# Patient Record
Sex: MALE | Race: WHITE | NOT HISPANIC OR LATINO
[De-identification: names, ages, dates, MRNs, and addresses within clinical notes are randomized per-mention and may not be internally consistent; named-entity substitution may affect disease eponyms.]

---

## 2019-06-13 ENCOUNTER — APPOINTMENT (OUTPATIENT)
Dept: INTERNAL MEDICINE | Facility: CLINIC | Age: 64
End: 2019-06-13
Payer: MEDICAID

## 2019-06-13 VITALS
SYSTOLIC BLOOD PRESSURE: 120 MMHG | HEART RATE: 93 BPM | DIASTOLIC BLOOD PRESSURE: 70 MMHG | OXYGEN SATURATION: 97 % | BODY MASS INDEX: 22.48 KG/M2 | HEIGHT: 70 IN | WEIGHT: 157 LBS | TEMPERATURE: 99.1 F

## 2019-06-13 DIAGNOSIS — M72.0 PALMAR FASCIAL FIBROMATOSIS [DUPUYTREN]: ICD-10-CM

## 2019-06-13 DIAGNOSIS — Z11.4 ENCOUNTER FOR SCREENING FOR HUMAN IMMUNODEFICIENCY VIRUS [HIV]: ICD-10-CM

## 2019-06-13 DIAGNOSIS — Z00.00 ENCOUNTER FOR GENERAL ADULT MEDICAL EXAMINATION W/OUT ABNORMAL FINDINGS: ICD-10-CM

## 2019-06-13 DIAGNOSIS — M62.40 CONTRACTURE OF MUSCLE, UNSPECIFIED SITE: ICD-10-CM

## 2019-06-13 PROCEDURE — 99386 PREV VISIT NEW AGE 40-64: CPT | Mod: 25

## 2019-06-13 PROCEDURE — 94010 BREATHING CAPACITY TEST: CPT

## 2019-06-13 NOTE — PHYSICAL EXAM
[No Acute Distress] : no acute distress [Well Nourished] : well nourished [Well Developed] : well developed [Well-Appearing] : well-appearing [Normal Sclera/Conjunctiva] : normal sclera/conjunctiva [PERRL] : pupils equal round and reactive to light [Normal Outer Ear/Nose] : the outer ears and nose were normal in appearance [Normal Oropharynx] : the oropharynx was normal [Supple] : supple [No Lymphadenopathy] : no lymphadenopathy [No Respiratory Distress] : no respiratory distress  [Clear to Auscultation] : lungs were clear to auscultation bilaterally [No Accessory Muscle Use] : no accessory muscle use [Normal Rate] : normal rate  [Regular Rhythm] : with a regular rhythm [Normal S1, S2] : normal S1 and S2 [No Carotid Bruits] : no carotid bruits [Pedal Pulses Present] : the pedal pulses are present [No Edema] : there was no peripheral edema [Soft] : abdomen soft [Non Tender] : non-tender [Non-distended] : non-distended [No Masses] : no abdominal mass palpated [Normal Bowel Sounds] : normal bowel sounds [Normal Supraclavicular Nodes] : no supraclavicular lymphadenopathy [Normal Axillary Nodes] : no axillary lymphadenopathy [Normal Posterior Cervical Nodes] : no posterior cervical lymphadenopathy [Normal Anterior Cervical Nodes] : no anterior cervical lymphadenopathy [Grossly Normal Strength/Tone] : grossly normal strength/tone [Coordination Grossly Intact] : coordination grossly intact [No Focal Deficits] : no focal deficits [Normal Affect] : the affect was normal [Normal Insight/Judgement] : insight and judgment were intact [Declined Rectal Exam] : declined rectal exam [de-identified] : heart murmur [de-identified] : thyroid enlarged [de-identified] : inguinal LN [de-identified] : contracted tendon R tendon

## 2019-06-13 NOTE — PLAN
[FreeTextEntry1] : discussed CT scan for lung CA screen-pt refusing \par pt refused EKG- states he will send a recent EKG to me.\par rec shingrix vaccine \par pt will release tdap vaccine record\par spirometry-nl

## 2019-06-13 NOTE — COUNSELING
[Healthy eating counseling provided] : healthy eating [Activity counseling provided] : activity [Good understanding] : Patient has a good understanding of disease, goals and obesity follow-up plan [Discussed Risks and Advised to Quit Smoking] : Discussed risks and advised to quit smoking [Discussed Cessation Medication] : cessation medication was discussed [Discussed Cessation Strategies] : cessation strategies were discussed [Quit Smoking] : Quit Smoking

## 2019-06-13 NOTE — HISTORY OF PRESENT ILLNESS
[FreeTextEntry1] : CPE [de-identified] : vaccine- pt states he had tetanus vaccine.  pt refused pneum vaccine`\par diet- needs improvement\par exercise-no\par pt states he had CLL for 14 yr.  \par pt states he had labs at [facility] but I don't see any records on the computer\par pt c/o fatigue, weakness- last seen hematology 5 yr ago. pt denies any wgt loss over past yr.\par [facility]

## 2019-06-13 NOTE — HEALTH RISK ASSESSMENT
[0] : 2) Feeling down, depressed, or hopeless: Not at all (0) [Hepatitis C test offered] : Hepatitis C test offered [HIV test declined] : HIV test declined [ColonoscopyComments] : never [de-identified] : pt has dentures

## 2019-06-14 ENCOUNTER — EMERGENCY (EMERGENCY)
Facility: HOSPITAL | Age: 64
LOS: 1 days | Discharge: HOME | End: 2019-06-14
Attending: EMERGENCY MEDICINE | Admitting: EMERGENCY MEDICINE
Payer: MEDICAID

## 2019-06-14 ENCOUNTER — APPOINTMENT (OUTPATIENT)
Dept: INTERNAL MEDICINE | Facility: CLINIC | Age: 64
End: 2019-06-14
Payer: MEDICAID

## 2019-06-14 ENCOUNTER — CHART COPY (OUTPATIENT)
Age: 64
End: 2019-06-14

## 2019-06-14 ENCOUNTER — LABORATORY RESULT (OUTPATIENT)
Age: 64
End: 2019-06-14

## 2019-06-14 VITALS
HEART RATE: 80 BPM | TEMPERATURE: 98 F | SYSTOLIC BLOOD PRESSURE: 129 MMHG | RESPIRATION RATE: 15 BRPM | OXYGEN SATURATION: 99 % | DIASTOLIC BLOOD PRESSURE: 74 MMHG

## 2019-06-14 LAB
APTT BLD: 32.6 SEC — SIGNIFICANT CHANGE UP (ref 27.5–36.3)
BASOPHILS # BLD AUTO: 0.02 K/UL — SIGNIFICANT CHANGE UP (ref 0–0.2)
BASOPHILS NFR BLD AUTO: 0.1 % — SIGNIFICANT CHANGE UP (ref 0–2)
EOSINOPHIL # BLD AUTO: 0.15 K/UL — SIGNIFICANT CHANGE UP (ref 0–0.5)
EOSINOPHIL NFR BLD AUTO: 1.1 % — SIGNIFICANT CHANGE UP (ref 0–6)
HCT VFR BLD CALC: 24.6 % — LOW (ref 39–50)
HGB BLD-MCNC: 6.8 G/DL — CRITICAL LOW (ref 13–17)
IMM GRANULOCYTES NFR BLD AUTO: 0.1 % — SIGNIFICANT CHANGE UP (ref 0–1.5)
INR BLD: 1.18 — HIGH (ref 0.88–1.17)
LYMPHOCYTES # BLD AUTO: 11.49 K/UL — HIGH (ref 1–3.3)
LYMPHOCYTES # BLD AUTO: 83.4 % — HIGH (ref 13–44)
MCHC RBC-ENTMCNC: 25.4 PG — LOW (ref 27–34)
MCHC RBC-ENTMCNC: 27.6 % — LOW (ref 32–36)
MCV RBC AUTO: 91.8 FL — SIGNIFICANT CHANGE UP (ref 80–100)
MONOCYTES # BLD AUTO: 1.19 K/UL — HIGH (ref 0–0.9)
MONOCYTES NFR BLD AUTO: 8.6 % — SIGNIFICANT CHANGE UP (ref 2–14)
NEUTROPHILS # BLD AUTO: 0.91 K/UL — LOW (ref 1.8–7.4)
NEUTROPHILS NFR BLD AUTO: 6.7 % — LOW (ref 43–77)
NRBC # FLD: 0 K/UL — SIGNIFICANT CHANGE UP (ref 0–0)
PROTHROM AB SERPL-ACNC: 13.5 SEC — HIGH (ref 9.8–13.1)
RBC # BLD: 2.68 M/UL — LOW (ref 4.2–5.8)
RBC # FLD: 18.3 % — HIGH (ref 10.3–14.5)
WBC # BLD: 13.77 K/UL — HIGH (ref 3.8–10.5)
WBC # FLD AUTO: 13.77 K/UL — HIGH (ref 3.8–10.5)

## 2019-06-14 PROCEDURE — 99284 EMERGENCY DEPT VISIT MOD MDM: CPT

## 2019-06-14 PROCEDURE — 85060 BLOOD SMEAR INTERPRETATION: CPT

## 2019-06-14 PROCEDURE — 36415 COLL VENOUS BLD VENIPUNCTURE: CPT

## 2019-06-14 PROCEDURE — 99234 HOSP IP/OBS SM DT SF/LOW 45: CPT

## 2019-06-14 NOTE — ED PROVIDER NOTE - PHYSICAL EXAMINATION
PHYSICAL EXAM:  GENERAL: NAD, well-groomed, well-developed  HEAD:  Atraumatic, Normocephalic  EYES: EOMI, PERRLA, conjunctival pallor   ENMT: No tonsillar erythema, exudates, or enlargement; Moist mucous membranes  NECK: Supple, No JVD  HEART: Regular rate and rhythm; No murmurs, rubs, or gallops  RESPIRATORY: CTA B/L, No W/R/R  ABDOMEN: Soft, Nontender, Nondistended   NEURO: A&Ox3, nonfocal, moving all extremities  EXTREMITIES:  2+ Peripheral Pulses, No clubbing, cyanosis, or edema  SKIN: warm, dry, pale

## 2019-06-14 NOTE — ED PROVIDER NOTE - PROGRESS NOTE DETAILS
Havetry PGY1- hgb 6.8, down from 7.3 3 weeks ago, 1 unit PRBC ordered, pt refusing rectal/guiaic exam, will place in CDU

## 2019-06-14 NOTE — ED ADULT TRIAGE NOTE - CHIEF COMPLAINT QUOTE
alert was told to have transfusion doesn't know HGB  hx CLL  appears pale    no c/o pain or discomfort

## 2019-06-14 NOTE — ED PROVIDER NOTE - OBJECTIVE STATEMENT
63 yoM, PMHx of former opiate abuse, now on methadone, CLL for approx 17 years no tx sent to ED for anemia on outpatient labs today. Pt states it was seen on labs 1-2 months ago as well. Feels fatigued and weak. No CP, SOB, syncope, or palpitations. No blood in stool. No AC use. No transfusions in past.

## 2019-06-14 NOTE — ED ADULT NURSE NOTE - OBJECTIVE STATEMENT
break coverage RN- pt with PMH CLL, IV drug abuse, last use 5 years ago, currently in methadone clinic,  sent in from [facility] for low HBG, unsure of level. pt currently awake, a/ox3, vitally stable, ambulatory in NAD at this time, pt appears pale, denies any SOB, CP, visual changes, IV 20g placed to right forearm, blood work sent. MD at bedside, awaiting further orders from MD, will continue to monitor, pt safety maintained.

## 2019-06-14 NOTE — ED PROVIDER NOTE - ATTENDING CONTRIBUTION TO CARE
I performed a face-to-face evaluation of the patient and performed a history and physical examination. I agree with the history and physical examination.    Gracy: CLL. Called by PA for low Hb. No e/o bleeding. Labs, transfuse PRBCs.

## 2019-06-15 VITALS
RESPIRATION RATE: 18 BRPM | SYSTOLIC BLOOD PRESSURE: 115 MMHG | HEART RATE: 58 BPM | TEMPERATURE: 98 F | DIASTOLIC BLOOD PRESSURE: 56 MMHG | OXYGEN SATURATION: 100 %

## 2019-06-15 LAB
ALBUMIN SERPL ELPH-MCNC: 3.9 G/DL — SIGNIFICANT CHANGE UP (ref 3.3–5)
ALP SERPL-CCNC: 110 U/L — SIGNIFICANT CHANGE UP (ref 40–120)
ALT FLD-CCNC: 4 U/L — SIGNIFICANT CHANGE UP (ref 4–41)
ANION GAP SERPL CALC-SCNC: 12 MMO/L — SIGNIFICANT CHANGE UP (ref 7–14)
ANISOCYTOSIS BLD QL: SLIGHT — SIGNIFICANT CHANGE UP
AST SERPL-CCNC: 10 U/L — SIGNIFICANT CHANGE UP (ref 4–40)
BASOPHILS NFR SPEC: 1.7 % — SIGNIFICANT CHANGE UP (ref 0–2)
BILIRUB SERPL-MCNC: 0.3 MG/DL — SIGNIFICANT CHANGE UP (ref 0.2–1.2)
BLASTS # FLD: 0 % — SIGNIFICANT CHANGE UP (ref 0–0)
BLD GP AB SCN SERPL QL: NEGATIVE — SIGNIFICANT CHANGE UP
BUN SERPL-MCNC: 16 MG/DL — SIGNIFICANT CHANGE UP (ref 7–23)
CALCIUM SERPL-MCNC: 8.7 MG/DL — SIGNIFICANT CHANGE UP (ref 8.4–10.5)
CHLORIDE SERPL-SCNC: 104 MMOL/L — SIGNIFICANT CHANGE UP (ref 98–107)
CO2 SERPL-SCNC: 25 MMOL/L — SIGNIFICANT CHANGE UP (ref 22–31)
CREAT SERPL-MCNC: 0.91 MG/DL — SIGNIFICANT CHANGE UP (ref 0.5–1.3)
DACRYOCYTES BLD QL SMEAR: SLIGHT — SIGNIFICANT CHANGE UP
ELLIPTOCYTES BLD QL SMEAR: SLIGHT — SIGNIFICANT CHANGE UP
EOSINOPHIL NFR FLD: 2.6 % — SIGNIFICANT CHANGE UP (ref 0–6)
GIANT PLATELETS BLD QL SMEAR: PRESENT — SIGNIFICANT CHANGE UP
GLUCOSE SERPL-MCNC: 112 MG/DL — HIGH (ref 70–99)
HAPTOGLOB SERPL-MCNC: 326 MG/DL — HIGH (ref 34–200)
HCT VFR BLD CALC: 27.9 % — LOW (ref 39–50)
HGB BLD-MCNC: 8.1 G/DL — LOW (ref 13–17)
HYPOCHROMIA BLD QL: SIGNIFICANT CHANGE UP
LDH SERPL L TO P-CCNC: 214 U/L — SIGNIFICANT CHANGE UP (ref 135–225)
LYMPHOCYTES NFR SPEC AUTO: 68.4 % — HIGH (ref 13–44)
MACROCYTES BLD QL: SLIGHT — SIGNIFICANT CHANGE UP
MCHC RBC-ENTMCNC: 26 PG — LOW (ref 27–34)
MCHC RBC-ENTMCNC: 29 % — LOW (ref 32–36)
MCV RBC AUTO: 89.7 FL — SIGNIFICANT CHANGE UP (ref 80–100)
METAMYELOCYTES # FLD: 0 % — SIGNIFICANT CHANGE UP (ref 0–1)
MICROCYTES BLD QL: SLIGHT — SIGNIFICANT CHANGE UP
MONOCYTES NFR BLD: 2.6 % — SIGNIFICANT CHANGE UP (ref 2–9)
MYELOCYTES NFR BLD: 0 % — SIGNIFICANT CHANGE UP (ref 0–0)
NEUTROPHIL AB SER-ACNC: 14.5 % — LOW (ref 43–77)
NEUTS BAND # BLD: 0 % — SIGNIFICANT CHANGE UP (ref 0–6)
NRBC # FLD: 0 K/UL — SIGNIFICANT CHANGE UP (ref 0–0)
OTHER - HEMATOLOGY %: 0.8 — SIGNIFICANT CHANGE UP
OVALOCYTES BLD QL SMEAR: SLIGHT — SIGNIFICANT CHANGE UP
PLATELET # BLD AUTO: 75 K/UL — LOW (ref 150–400)
PLATELET # BLD AUTO: 82 K/UL — LOW (ref 150–400)
PLATELET COUNT - ESTIMATE: SIGNIFICANT CHANGE UP
PMV BLD: 10.3 FL — SIGNIFICANT CHANGE UP (ref 7–13)
PMV BLD: 10.9 FL — SIGNIFICANT CHANGE UP (ref 7–13)
POIKILOCYTOSIS BLD QL AUTO: SLIGHT — SIGNIFICANT CHANGE UP
POLYCHROMASIA BLD QL SMEAR: SLIGHT — SIGNIFICANT CHANGE UP
POTASSIUM SERPL-MCNC: 4.1 MMOL/L — SIGNIFICANT CHANGE UP (ref 3.5–5.3)
POTASSIUM SERPL-SCNC: 4.1 MMOL/L — SIGNIFICANT CHANGE UP (ref 3.5–5.3)
PROMYELOCYTES # FLD: 0 % — SIGNIFICANT CHANGE UP (ref 0–0)
PROT SERPL-MCNC: 6.9 G/DL — SIGNIFICANT CHANGE UP (ref 6–8.3)
RBC # BLD: 3.11 M/UL — LOW (ref 4.2–5.8)
RBC # FLD: 17.7 % — HIGH (ref 10.3–14.5)
RH IG SCN BLD-IMP: POSITIVE — SIGNIFICANT CHANGE UP
RH IG SCN BLD-IMP: POSITIVE — SIGNIFICANT CHANGE UP
SCHISTOCYTES BLD QL AUTO: SLIGHT — SIGNIFICANT CHANGE UP
SODIUM SERPL-SCNC: 141 MMOL/L — SIGNIFICANT CHANGE UP (ref 135–145)
STOMATOCYTES BLD QL SMEAR: SLIGHT — SIGNIFICANT CHANGE UP
VARIANT LYMPHS # BLD: 9.4 % — SIGNIFICANT CHANGE UP
WBC # BLD: 11.33 K/UL — HIGH (ref 3.8–10.5)
WBC # FLD AUTO: 11.33 K/UL — HIGH (ref 3.8–10.5)

## 2019-06-15 PROCEDURE — 99285 EMERGENCY DEPT VISIT HI MDM: CPT | Mod: GC

## 2019-06-15 RX ORDER — METHADONE HYDROCHLORIDE 40 MG/1
110 TABLET ORAL ONCE
Refills: 0 | Status: DISCONTINUED | OUTPATIENT
Start: 2019-06-15 | End: 2019-06-15

## 2019-06-15 NOTE — ED CDU PROVIDER DISPOSITION NOTE - NSFOLLOWUPINSTRUCTIONS_ED_ALL_ED_FT
PLEASE MAKE SURE THAT YOU FOLLOW UP WITH THE ONCOLOGY CLINIC - IF YOU DON'T RECEIVE A CALL FROM THE CLINIC PLEASE CALL [phone] FOR APPOINTMENT. CONTINUE WITH YOUR DAILY MEDICATIONS AS PRESCRIBED, DON'T TAKE METHADONE AS YOU RECEIVED A DOSE TODAY. RETURN TO ER FOR CHEST PAIN, SHORTNESS OF BREATH OR ANY CONCERNS.

## 2019-06-15 NOTE — CONSULT NOTE ADULT - ASSESSMENT
62 yo M hx of CLL (per patient, dx'ed 17 years ago but did not continue f/u with hematology), opioid abuse, sent to [facility] for anemia found on outpatient labs.  Patient reports feeling fatigued and weak for years.  NO CP or SOB.  Hg was 6.8, transfused 2 units and responded.    hx of CLL  - Absolute lymphocyte count 11,000  -   - anemia/thrombocytopenia likely 2/2 worsening disease  - smear reviewed: microcytic RBCs, no schistocytes, few smudge cells.  - flow cytometry sent  - [place] [facility] [place] for further treatment    Yulisa Carrillo DO  Hematology/Oncology Fellow, PGY4  Pager: [phone]

## 2019-06-15 NOTE — ED CDU PROVIDER DISPOSITION NOTE - CLINICAL COURSE
Pt is 62 y/o male with pmhx of CLL, diagnosed over 17yrs ago, doesn't f/u with heme/onc, also on MMTP at [facility], in cdu for low H&H, has hx of anemia, never transfused but also has been seeing pcp sporadically; reported weakness and palpitations which have resolved after transfusion. While in cdu declined guaiac; seen by heme/onc, flow cytometery pending, pt Pt is 62 y/o male with pmhx of CLL, diagnosed over 17yrs ago, doesn't f/u with heme/onc, also on MMTP at [facility], in cdu for low H&H, has hx of anemia, never transfused but also has been seeing pcp sporadically; reported weakness and palpitations which have resolved after transfusion. While in cdu declined guaiac; seen by heme/onc, flow cytometery pending, pt will f/u with heme/onc clinic.

## 2019-06-15 NOTE — CONSULT NOTE ADULT - SUBJECTIVE AND OBJECTIVE BOX
HPI:  64 yo M hx of CLL (per patient, dx'ed 17 years ago but did not continue f/u with hematology), opioid abuse, sent to [facility] for anemia found on outpatient labs.  Patient reports feeling fatigued and weak for years.  NO CP or SOB.  Hg was 6.8, transfused 2 units and responded.    Patient reports he was dxed with CLL but was told he did not need any meds.  Did not f/u regularly with hematology but would like to now.     PAST MEDICAL & SURGICAL HISTORY:  Methadone maintenance therapy patient  CLL (chronic lymphocytic leukemia)  No significant past surgical history      MEDICATIONS  (STANDING):    MEDICATIONS  (PRN):      Allergies    No Known Allergies    Intolerances        SOCIAL HISTORY:    FAMILY HISTORY:      Vital Signs Last 24 Hrs  T(C): 36.6 (15 Gold 2019 14:02), Max: 36.9 (14 Jun 2019 22:31)  T(F): 97.9 (15 Gold 2019 14:02), Max: 98.5 (15 Gold 2019 02:15)  HR: 58 (15 Gold 2019 14:02) (58 - 81)  BP: 115/56 (15 Gold 2019 14:02) (101/61 - 129/74)  BP(mean): --  RR: 18 (15 Gold 2019 14:02) (15 - 18)  SpO2: 100% (15 Gold 2019 14:02) (98% - 100%)    PHYSICAL EXAM:    GENERAL: NAD, AAOx3   HEAD:  NC/AT  EYES: EOMI, PERRLA, no scleral icterus  HEENT: Moist mucous membranes  LUNG: Clear to auscultation bilaterally; No rales, rhonchi, wheezing, or rubs  HEART: RRR; No murmurs, rubs, or gallops  ABDOMEN: +BS, ST/ND/NT  EXTREMITIES:  2+ Peripheral Pulses, No clubbing, cyanosis, or edema  LAD: no palpable adenopathy    LABS:                        8.1    11.33 )-----------( 75       ( 15 Gold 2019 10:45 )             27.9     06-14    141  |  104  |  16  ----------------------------<  112<H>  4.1   |  25  |  0.91    Ca    8.7      14 Jun 2019 23:00    TPro  6.9  /  Alb  3.9  /  TBili  0.3  /  DBili  x   /  AST  10  /  ALT  4   /  AlkPhos  110  06-14    PT/INR - ( 14 Jun 2019 23:00 )   PT: 13.5 SEC;   INR: 1.18          PTT - ( 14 Jun 2019 23:00 )  PTT:32.6 SEC          RADIOLOGY & ADDITIONAL STUDIES:

## 2019-06-15 NOTE — ED CDU PROVIDER DISPOSITION NOTE - ATTENDING CONTRIBUTION TO CARE
63M on methadone p/w anemia.  H/o CLL.  Declined rectal exam.  Plan Rx 2U PRBC, H/O eval.  Pt reports he was dx with CLL ~15 years ago and they said they couldn't do any thing for him, [facility], pt was called to come in by PMD after a routine blood work in the office.  Pt endorses gen weakness and fatigue for years.  Took methadone yesterday but not today, has it at his house, pt has been on MM for years, follows at [facility].  VS:  unremarkable    GEN - NAD; malaise; A+O x3 Pallor, cachexia  HEAD - NC/AT     ENT - PEERL, EOMI, mucous membranes  dry, no discharge      NECK: Neck supple, non-tender without lymphadenopathy, no masses, no JVD  PULM - CTA b/l,  symmetric breath sounds  COR -  normal heart sounds    ABD - , ND, NT, soft,  BACK - no CVA tenderness, nontender spine     EXTREMS - no edema, no deformity, warm and well perfused    SKIN - no rash or bruising      NEUROLOGIC - alert, CN 2-12 intact, sensation nl, motor no focal deficit.

## 2019-06-15 NOTE — ED CDU PROVIDER INITIAL DAY NOTE - ATTENDING CONTRIBUTION TO CARE
63M on methadone p/w anemia.  H/o CLL.  Declined rectal exam.  Plan Rx 2U PRBC, H/O eval.  Pt reports he was dx with CLL ~15 years ago and they said they couldn't do any thing for him, [facility], pt was called to come in by PMD after a routine blood work in the office.  Pt endorses gen weakness and fatigue for years.  Took methadone yesterday but not today, has it at his house, pt has been on MM for years, follows at [facility].  VS:  unremarkable    GEN - NAD; malaise; A+O x3 Pallor, cachexia  HEAD - NC/AT     ENT - PEERL, EOMI, mucous membranes  dry, no discharge      NECK: Neck supple, non-tender without lymphadenopathy, no masses, no JVD  PULM - CTA b/l,  symmetric breath sounds  COR -  normal heart sounds    ABD - , ND, NT, soft,  BACK - no CVA tenderness, nontender spine     EXTREMS - no edema, no deformity, warm and well perfused    SKIN - no rash or bruising      NEUROLOGIC - alert, CN 2-12 intact, sensation nl, motor no focal deficit.

## 2019-06-15 NOTE — ED CDU PROVIDER INITIAL DAY NOTE - OBJECTIVE STATEMENT
63 yoM, PMHx of former opiate abuse, now on methadone, CLL for approx 17 years no tx sent to ED for anemia on outpatient labs today. Pt states it was seen on labs 1-2 months ago as well but no treatment at the time. Feels fatigued and weak for years, not unusual for him  as per patient. No CP, SOB, syncope, near syncope, or palpitations. No blood in stool.  no abd pains, No AC use. No transfusions in past.  Found to have hgb 6.8 in the ED , sent to CDU for 2 units and rpt cbc.  ( took his methadone today)

## 2019-06-15 NOTE — ED CDU PROVIDER INITIAL DAY NOTE - PROGRESS NOTE DETAILS
Pt resting comfortably, NAD> Receiving first unit prbc, Will give 2 units and rpt cbc Pt on methadone 110mg, follows up at [facility] methadone clinic. dose verified by DR Vick with psychiatry.

## 2019-06-17 PROBLEM — C91.90 LYMPHOID LEUKEMIA, UNSPECIFIED NOT HAVING ACHIEVED REMISSION: Chronic | Status: ACTIVE | Noted: 2019-06-14

## 2019-06-17 PROBLEM — F11.20 OPIOID DEPENDENCE, UNCOMPLICATED: Chronic | Status: ACTIVE | Noted: 2019-06-14

## 2019-06-17 LAB
ALBUMIN SERPL ELPH-MCNC: 4.1 G/DL
ALP BLD-CCNC: 106 U/L
ALT SERPL-CCNC: <5 U/L
ANION GAP SERPL CALC-SCNC: 9 MMOL/L
APPEARANCE: CLEAR
AST SERPL-CCNC: 12 U/L
BACTERIA: NEGATIVE
BASOPHILS # BLD AUTO: 0 K/UL
BASOPHILS NFR BLD AUTO: 0 %
BILIRUB SERPL-MCNC: 0.4 MG/DL
BILIRUBIN URINE: NEGATIVE
BLOOD URINE: NEGATIVE
BUN SERPL-MCNC: 16 MG/DL
CALCIUM SERPL-MCNC: 9.1 MG/DL
CHLORIDE SERPL-SCNC: 104 MMOL/L
CHOLEST SERPL-MCNC: 116 MG/DL
CHOLEST/HDLC SERPL: 4.1 RATIO
CO2 SERPL-SCNC: 25 MMOL/L
COLOR: YELLOW
CREAT SERPL-MCNC: 0.9 MG/DL
EOSINOPHIL # BLD AUTO: 0 K/UL
EOSINOPHIL NFR BLD AUTO: 0 %
ESTIMATED AVERAGE GLUCOSE: 100 MG/DL
GLUCOSE QUALITATIVE U: NEGATIVE
GLUCOSE SERPL-MCNC: 102 MG/DL
HBA1C MFR BLD HPLC: 5.1 %
HCT VFR BLD CALC: 25.3 %
HDLC SERPL-MCNC: 28 MG/DL
HGB BLD-MCNC: 6.8 G/DL
HYALINE CASTS: 2 /LPF
KETONES URINE: NEGATIVE
LDLC SERPL CALC-MCNC: 79 MG/DL
LEUKOCYTE ESTERASE URINE: NEGATIVE
LYMPHOCYTES # BLD AUTO: 11.1 K/UL
LYMPHOCYTES NFR BLD AUTO: 82 %
MAN DIFF?: YES
MCHC RBC-ENTMCNC: 25.5 PG
MCHC RBC-ENTMCNC: 26.9 GM/DL
MCV RBC AUTO: 94.8 FL
MICROSCOPIC-UA: NORMAL
MONOCYTES # BLD AUTO: 0.27 K/UL
MONOCYTES NFR BLD AUTO: 2 %
NEUTROPHILS # BLD AUTO: 1.22 K/UL
NEUTROPHILS NFR BLD AUTO: 9 %
NITRITE URINE: NEGATIVE
PH URINE: 5.5
PLATELET # BLD AUTO: 87 K/UL
POTASSIUM SERPL-SCNC: 4.8 MMOL/L
PROT SERPL-MCNC: 5.7 G/DL
PROTEIN URINE: ABNORMAL
RBC # BLD: 2.67 M/UL
RBC # FLD: 18.5 %
RED BLOOD CELLS URINE: 3 /HPF
SAVE SPECIMEN: NORMAL
SODIUM SERPL-SCNC: 138 MMOL/L
SPECIFIC GRAVITY URINE: 1.03
SQUAMOUS EPITHELIAL CELLS: 1 /HPF
TRIGL SERPL-MCNC: 46 MG/DL
TSH SERPL-ACNC: 2.96 UIU/ML
UROBILINOGEN URINE: NORMAL
WBC # FLD AUTO: 13.54 K/UL
WHITE BLOOD CELLS URINE: 1 /HPF

## 2019-06-17 PROCEDURE — 88189 FLOWCYTOMETRY/READ 16 & >: CPT

## 2019-06-19 ENCOUNTER — OUTPATIENT (OUTPATIENT)
Dept: OUTPATIENT SERVICES | Facility: HOSPITAL | Age: 64
LOS: 1 days | Discharge: HOME | End: 2019-06-19

## 2019-06-19 DIAGNOSIS — C91.10 CHRONIC LYMPHOCYTIC LEUKEMIA OF B-CELL TYPE NOT HAVING ACHIEVED REMISSION: ICD-10-CM

## 2019-06-20 NOTE — ED POST DISCHARGE NOTE - RESULT SUMMARY
ESTUARDO Mejia: TM interpretation resulted, CD5 positive B-cell lymphoproliferative disorder. Spoke w/ heme fellow who recommends we call the pt back to ensure follow up on June 25 as scheduled, not to discuss results. Spoke w/ pt, states his shortness of breath improved sp transfusion. States he will follow up. Return precautions given.

## 2019-06-25 ENCOUNTER — RESULT REVIEW (OUTPATIENT)
Age: 64
End: 2019-06-25

## 2019-06-25 ENCOUNTER — APPOINTMENT (OUTPATIENT)
Dept: HEMATOLOGY ONCOLOGY | Facility: CLINIC | Age: 64
End: 2019-06-25
Payer: MEDICAID

## 2019-06-25 ENCOUNTER — OUTPATIENT (OUTPATIENT)
Dept: OUTPATIENT SERVICES | Facility: HOSPITAL | Age: 64
LOS: 1 days | End: 2019-06-25
Payer: MEDICAID

## 2019-06-25 ENCOUNTER — LABORATORY RESULT (OUTPATIENT)
Age: 64
End: 2019-06-25

## 2019-06-25 VITALS
OXYGEN SATURATION: 98 % | HEART RATE: 77 BPM | BODY MASS INDEX: 22.41 KG/M2 | WEIGHT: 156.53 LBS | TEMPERATURE: 98.6 F | SYSTOLIC BLOOD PRESSURE: 127 MMHG | DIASTOLIC BLOOD PRESSURE: 64 MMHG | RESPIRATION RATE: 16 BRPM | HEIGHT: 70.08 IN

## 2019-06-25 DIAGNOSIS — C91.90 LYMPHOID LEUKEMIA, UNSPECIFIED NOT HAVING ACHIEVED REMISSION: ICD-10-CM

## 2019-06-25 LAB
BLD GP AB SCN SERPL QL: NEGATIVE — SIGNIFICANT CHANGE UP
DAT POLY-SP REAG RBC QL: NEGATIVE — SIGNIFICANT CHANGE UP
EOSINOPHIL # BLD AUTO: 0.2 K/UL — SIGNIFICANT CHANGE UP (ref 0–0.5)
EOSINOPHIL NFR BLD AUTO: 1 % — SIGNIFICANT CHANGE UP (ref 0–6)
HCT VFR BLD CALC: 27.7 % — LOW (ref 39–50)
HGB BLD-MCNC: 8.5 G/DL — LOW (ref 13–17)
LYMPHOCYTES # BLD AUTO: 76 % — HIGH (ref 13–44)
LYMPHOCYTES # BLD AUTO: 9.8 K/UL — HIGH (ref 1–3.3)
LYMPHOCYTES # SPEC AUTO: 3 % — HIGH (ref 0–0)
MCHC RBC-ENTMCNC: 26.6 PG — LOW (ref 27–34)
MCHC RBC-ENTMCNC: 30.6 G/DL — LOW (ref 32–36)
MCV RBC AUTO: 86.9 FL — SIGNIFICANT CHANGE UP (ref 80–100)
MONOCYTES # BLD AUTO: 0.3 K/UL — SIGNIFICANT CHANGE UP (ref 0–0.9)
MONOCYTES NFR BLD AUTO: 7 % — SIGNIFICANT CHANGE UP (ref 2–14)
NEUTROPHILS # BLD AUTO: 1 K/UL — LOW (ref 1.8–7.4)
NEUTROPHILS NFR BLD AUTO: 13 % — LOW (ref 43–77)
PLAT MORPH BLD: NORMAL — SIGNIFICANT CHANGE UP
PLATELET # BLD AUTO: 79 K/UL — LOW (ref 150–400)
RBC # BLD: 3.19 M/UL — LOW (ref 4.2–5.8)
RBC # FLD: 16.7 % — HIGH (ref 10.3–14.5)
RBC BLD AUTO: SIGNIFICANT CHANGE UP
RETICS #: 59.7 K/UL — SIGNIFICANT CHANGE UP (ref 25–125)
RETICS/RBC NFR: 2 % — SIGNIFICANT CHANGE UP (ref 0.5–2.5)
RH IG SCN BLD-IMP: POSITIVE — SIGNIFICANT CHANGE UP
WBC # BLD: 11.5 K/UL — HIGH (ref 3.8–10.5)
WBC # FLD AUTO: 11.5 K/UL — HIGH (ref 3.8–10.5)

## 2019-06-25 PROCEDURE — 99215 OFFICE O/P EST HI 40 MIN: CPT

## 2019-06-25 PROCEDURE — 88291 CYTO/MOLECULAR REPORT: CPT

## 2019-06-25 NOTE — ADDENDUM
[FreeTextEntry1] : Documented by Jovan Espinoza acting as a scribe for Dr. Anjel Carvajal on 06/25/2019.\par \par All medical record entries made by a scribe were at my, Dr. Anjel Carvajal direction and personally dictated by me on 06/25/2019. I have reviewed the chart and agree that the record accurately reflects my personal performance of the history, physical exam, procedure and imaging.\par

## 2019-06-25 NOTE — HISTORY OF PRESENT ILLNESS
[de-identified] : Mr. Meyer is a 63 year-old male who was diagnosed with CLL in 2003. He has been followed intermittently and was loss of a f/u with hematology. When seen on 5/20/03 the Wbc was 33.5, and was described as progressive.He was seen at the [facility] by Dr. Schulte. \par He was also seen at [facility] ED on 6/14/19 with severe anemia, Hgb of 6.8.  At that time he reported feeling severe fatigue and weakness for years, the Wbc was 13.54, Hgb 6.8, MCV 94.8, Plt 87K, the Anc was 1.22. The CMP showed no abnormalities. 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 He had dyspnea pretransfusion which has improved. He described weight loss back to 2003 when first diagnosed and has more recently stabilized for the last two years. 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 \par The flow in [facility] ED was sent from [facility] and showed monotypic B-cells (26% of cells), (+) kappa, CD19 CD20, CD5, partial FMC-7; (-) for CD10 CD23 CD38, findings were consistent with CD5 positive B-cell lymphoproliferative disorder. \par The records are incomplete but has been occasionally followed up by Dr. Schulte a hematologist in [facility].\par He c/o significant fatigue that has been worsening over the years.  [de-identified] : Initial outpatient visit

## 2019-06-25 NOTE — PHYSICAL EXAM
[Normal] : affect appropriate [de-identified] : adentulous [de-identified] : spleen enlarged 11 cm below LCM extending to the right 3 cm beyond the midline. Trace pedal edema [de-identified] : He describes BBB, not sure which side [de-identified] : small cervical nodes 1-1.5 cm inguinal and femural

## 2019-06-25 NOTE — ASSESSMENT
[FreeTextEntry1] : Stage III or IV CLL with a long hx and very spotty f/u. He has had increased fatigue over the last several months but notes a long hx of feeling fatigue and weakness. Today CBC shows WBC 11.5, Hgb 8.5, Plt 79 K. with 13% PMN, 76% lymphs, ANC 1.0\par Flow cytometry done on 6/17/19 is fully consistent will B-CLL except for lack of expression CD23. \par findings point to a CD5 (+) B-cell lymphoproliferative disorder\par He has a long history of IVDA and he is being maintained on Methadone. He has a hx of being exposed to Hep C. He says that his viral node load is (-)\par We will asses peripheral blood for CLL prognostic factors including IGVH hypermutation assay, ZAP70 and FISH analysis. \par A Retic count, CMP, LDH, Beta 2 MG, uric acid, SPEP, Quantitative immunoglobulins and viral serologies were drawn. \par Hepatitis C viral load will be checked.\par A bone marrow aspirate and biopsy will be scheduled.\par His WBC has fallen over the years and he has become more anemic and thrombocytopenic. We will see if his cytopenias are due to marrow replacement and/or hypersplenism. The marrow should be done in the next week or so. We will see if he is need of a transfusion in the next few weeks.\par Tx may include monotherapy with Imbruvica or combination therapy with Obi and Venetoclax.\par He is committed to the program and remains on his Methadone program. Smoking siustis was discussed, and he is considering trying to stop smoking.

## 2019-07-01 LAB — CHROM ANALY INTERPHASE BLD FISH-IMP: SIGNIFICANT CHANGE UP

## 2019-07-06 LAB
ALBUMIN MFR SERPL ELPH: 51.5 %
ALBUMIN SERPL ELPH-MCNC: 4.1 G/DL
ALBUMIN SERPL-MCNC: 3.5 G/DL
ALBUMIN/GLOB SERPL: 1.1 RATIO
ALP BLD-CCNC: 113 U/L
ALPHA1 GLOB MFR SERPL ELPH: 6.2 %
ALPHA1 GLOB SERPL ELPH-MCNC: 0.4 G/DL
ALPHA2 GLOB MFR SERPL ELPH: 12.6 %
ALPHA2 GLOB SERPL ELPH-MCNC: 0.8 G/DL
ALT SERPL-CCNC: 6 U/L
ANION GAP SERPL CALC-SCNC: 15 MMOL/L
AST SERPL-CCNC: 11 U/L
B-GLOBULIN MFR SERPL ELPH: 16.7 %
B-GLOBULIN SERPL ELPH-MCNC: 1.1 G/DL
B2 MICROGLOB SERPL-MCNC: 5.3 MG/L
BILIRUB SERPL-MCNC: 0.4 MG/DL
BUN SERPL-MCNC: 14 MG/DL
CALCIUM SERPL-MCNC: 8.5 MG/DL
CHLORIDE SERPL-SCNC: 103 MMOL/L
CO2 SERPL-SCNC: 22 MMOL/L
CREAT SERPL-MCNC: 0.94 MG/DL
FERRITIN SERPL-MCNC: 207 NG/ML
FOLATE SERPL-MCNC: 8.2 NG/ML
GAMMA GLOB FLD ELPH-MCNC: 0.9 G/DL
GAMMA GLOB MFR SERPL ELPH: 13 %
GLUCOSE SERPL-MCNC: 86 MG/DL
HAPTOGLOB SERPL-MCNC: 320 MG/DL
HBV CORE IGG+IGM SER QL: REACTIVE
HBV SURFACE AB SER QL: REACTIVE
HBV SURFACE AG SER QL: NONREACTIVE
HCV AB SER QL: REACTIVE
HCV S/CO RATIO: 10.72 S/CO
HIV1+2 AB SPEC QL IA.RAPID: NONREACTIVE
IGA SER QL IEP: 62 MG/DL
IGG SER QL IEP: 1025 MG/DL
IGM SER QL IEP: 710 MG/DL
INTERPRETATION SERPL IEP-IMP: NORMAL
IRON SATN MFR SERPL: 16 %
IRON SERPL-MCNC: 34 UG/DL
LDH SERPL-CCNC: 208 U/L
M PROTEIN MFR SERPL ELPH: NORMAL
MONOCLON BAND OBS SERPL: NORMAL
POTASSIUM SERPL-SCNC: 4.3 MMOL/L
PROT SERPL-MCNC: 6.7 G/DL
PROT SERPL-MCNC: 6.7 G/DL
PROT SERPL-MCNC: 6.8 G/DL
SODIUM SERPL-SCNC: 139 MMOL/L
TIBC SERPL-MCNC: 219 UG/DL
UIBC SERPL-MCNC: 185 UG/DL
URATE SERPL-MCNC: 5.5 MG/DL
VIT B12 SERPL-MCNC: 358 PG/ML

## 2019-07-10 ENCOUNTER — APPOINTMENT (OUTPATIENT)
Dept: HEMATOLOGY ONCOLOGY | Facility: CLINIC | Age: 64
End: 2019-07-10
Payer: MEDICAID

## 2019-07-10 ENCOUNTER — RESULT REVIEW (OUTPATIENT)
Age: 64
End: 2019-07-10

## 2019-07-10 ENCOUNTER — OUTPATIENT (OUTPATIENT)
Dept: OUTPATIENT SERVICES | Facility: HOSPITAL | Age: 64
LOS: 1 days | End: 2019-07-10
Payer: MEDICAID

## 2019-07-10 VITALS
TEMPERATURE: 98.1 F | SYSTOLIC BLOOD PRESSURE: 119 MMHG | OXYGEN SATURATION: 96 % | RESPIRATION RATE: 16 BRPM | HEART RATE: 87 BPM | WEIGHT: 157.85 LBS | BODY MASS INDEX: 22.6 KG/M2 | DIASTOLIC BLOOD PRESSURE: 58 MMHG

## 2019-07-10 DIAGNOSIS — C91.10 CHRONIC LYMPHOCYTIC LEUKEMIA OF B-CELL TYPE NOT HAVING ACHIEVED REMISSION: ICD-10-CM

## 2019-07-10 LAB
BASO STIPL BLD QL SMEAR: PRESENT — SIGNIFICANT CHANGE UP
BASOPHILS # BLD AUTO: 0.1 K/UL — SIGNIFICANT CHANGE UP (ref 0–0.2)
EOSINOPHIL # BLD AUTO: 0.2 K/UL — SIGNIFICANT CHANGE UP (ref 0–0.5)
EOSINOPHIL NFR BLD AUTO: 1 % — SIGNIFICANT CHANGE UP (ref 0–6)
HCT VFR BLD CALC: 25.1 % — LOW (ref 39–50)
HGB BLD-MCNC: 7.8 G/DL — LOW (ref 13–17)
LYMPHOCYTES # BLD AUTO: 10.7 K/UL — HIGH (ref 1–3.3)
LYMPHOCYTES # BLD AUTO: 73 % — HIGH (ref 13–44)
LYMPHOCYTES # SPEC AUTO: 8 % — HIGH (ref 0–0)
MCHC RBC-ENTMCNC: 26.8 PG — LOW (ref 27–34)
MCHC RBC-ENTMCNC: 31 G/DL — LOW (ref 32–36)
MCV RBC AUTO: 86.7 FL — SIGNIFICANT CHANGE UP (ref 80–100)
MONOCYTES # BLD AUTO: 0.3 K/UL — SIGNIFICANT CHANGE UP (ref 0–0.9)
MONOCYTES NFR BLD AUTO: 5 % — SIGNIFICANT CHANGE UP (ref 2–14)
NEUTROPHILS # BLD AUTO: 1 K/UL — LOW (ref 1.8–7.4)
NEUTROPHILS NFR BLD AUTO: 13 % — LOW (ref 43–77)
PLAT MORPH BLD: NORMAL — SIGNIFICANT CHANGE UP
PLATELET # BLD AUTO: 85 K/UL — LOW (ref 150–400)
RBC # BLD: 2.9 M/UL — LOW (ref 4.2–5.8)
RBC # FLD: 17.7 % — HIGH (ref 10.3–14.5)
RBC BLD AUTO: SIGNIFICANT CHANGE UP
WBC # BLD: 12.3 K/UL — HIGH (ref 3.8–10.5)
WBC # FLD AUTO: 12.3 K/UL — HIGH (ref 3.8–10.5)

## 2019-07-10 PROCEDURE — 88342 IMHCHEM/IMCYTCHM 1ST ANTB: CPT

## 2019-07-10 PROCEDURE — 88313 SPECIAL STAINS GROUP 2: CPT

## 2019-07-10 PROCEDURE — 88305 TISSUE EXAM BY PATHOLOGIST: CPT | Mod: 26

## 2019-07-10 PROCEDURE — 38222 DX BONE MARROW BX & ASPIR: CPT | Mod: RT

## 2019-07-10 PROCEDURE — 88365 INSITU HYBRIDIZATION (FISH): CPT

## 2019-07-10 PROCEDURE — 86901 BLOOD TYPING SEROLOGIC RH(D): CPT

## 2019-07-10 PROCEDURE — 86850 RBC ANTIBODY SCREEN: CPT

## 2019-07-10 PROCEDURE — 88275 CYTOGENETICS 100-300: CPT

## 2019-07-10 PROCEDURE — 88341 IMHCHEM/IMCYTCHM EA ADD ANTB: CPT | Mod: 26,59

## 2019-07-10 PROCEDURE — 88360 TUMOR IMMUNOHISTOCHEM/MANUAL: CPT

## 2019-07-10 PROCEDURE — 88271 CYTOGENETICS DNA PROBE: CPT

## 2019-07-10 PROCEDURE — 88189 FLOWCYTOMETRY/READ 16 & >: CPT

## 2019-07-10 PROCEDURE — 88364 INSITU HYBRIDIZATION (FISH): CPT

## 2019-07-10 PROCEDURE — 88364 INSITU HYBRIDIZATION (FISH): CPT | Mod: 26

## 2019-07-10 PROCEDURE — 88365 INSITU HYBRIDIZATION (FISH): CPT | Mod: 26,59

## 2019-07-10 PROCEDURE — 88342 IMHCHEM/IMCYTCHM 1ST ANTB: CPT | Mod: 26,59

## 2019-07-10 PROCEDURE — 88305 TISSUE EXAM BY PATHOLOGIST: CPT

## 2019-07-10 PROCEDURE — 85097 BONE MARROW INTERPRETATION: CPT

## 2019-07-10 PROCEDURE — 87205 SMEAR GRAM STAIN: CPT

## 2019-07-10 PROCEDURE — 88185 FLOWCYTOMETRY/TC ADD-ON: CPT

## 2019-07-10 PROCEDURE — 88313 SPECIAL STAINS GROUP 2: CPT | Mod: 26

## 2019-07-10 PROCEDURE — 88291 CYTO/MOLECULAR REPORT: CPT

## 2019-07-10 PROCEDURE — 88341 IMHCHEM/IMCYTCHM EA ADD ANTB: CPT

## 2019-07-10 PROCEDURE — 88237 TISSUE CULTURE BONE MARROW: CPT

## 2019-07-10 PROCEDURE — 86880 COOMBS TEST DIRECT: CPT

## 2019-07-10 PROCEDURE — 88360 TUMOR IMMUNOHISTOCHEM/MANUAL: CPT | Mod: 26

## 2019-07-10 PROCEDURE — 88184 FLOWCYTOMETRY/ TC 1 MARKER: CPT

## 2019-07-10 PROCEDURE — 86900 BLOOD TYPING SEROLOGIC ABO: CPT

## 2019-07-12 ENCOUNTER — APPOINTMENT (OUTPATIENT)
Dept: INTERNAL MEDICINE | Facility: CLINIC | Age: 64
End: 2019-07-12
Payer: MEDICAID

## 2019-07-12 LAB — TM INTERPRETATION: SIGNIFICANT CHANGE UP

## 2019-07-15 ENCOUNTER — FORM ENCOUNTER (OUTPATIENT)
Age: 64
End: 2019-07-15

## 2019-07-15 LAB — HEMATOPATHOLOGY REPORT: SIGNIFICANT CHANGE UP

## 2019-07-16 ENCOUNTER — OUTPATIENT (OUTPATIENT)
Dept: OUTPATIENT SERVICES | Facility: HOSPITAL | Age: 64
LOS: 1 days | End: 2019-07-16
Payer: MEDICAID

## 2019-07-16 ENCOUNTER — APPOINTMENT (OUTPATIENT)
Dept: ULTRASOUND IMAGING | Facility: IMAGING CENTER | Age: 64
End: 2019-07-16
Payer: MEDICAID

## 2019-07-16 DIAGNOSIS — C91.90 LYMPHOID LEUKEMIA, UNSPECIFIED NOT HAVING ACHIEVED REMISSION: ICD-10-CM

## 2019-07-16 LAB — CHROM ANALY INTERPHASE BLD FISH-IMP: SIGNIFICANT CHANGE UP

## 2019-07-16 PROCEDURE — 76700 US EXAM ABDOM COMPLETE: CPT

## 2019-07-16 PROCEDURE — 76700 US EXAM ABDOM COMPLETE: CPT | Mod: 26

## 2019-07-17 LAB — MISCELLANEOUS TEST NAME: SIGNIFICANT CHANGE UP

## 2019-07-18 ENCOUNTER — RESULT REVIEW (OUTPATIENT)
Age: 64
End: 2019-07-18

## 2019-07-18 LAB
ALBUMIN SERPL ELPH-MCNC: 4 G/DL
ALP BLD-CCNC: 116 U/L
ALT SERPL-CCNC: 7 U/L
ANION GAP SERPL CALC-SCNC: 10 MMOL/L
AST SERPL-CCNC: 13 U/L
BILIRUB SERPL-MCNC: 0.5 MG/DL
BUN SERPL-MCNC: 12 MG/DL
CALCIUM SERPL-MCNC: 8.7 MG/DL
CHLORIDE SERPL-SCNC: 103 MMOL/L
CO2 SERPL-SCNC: 26 MMOL/L
CREAT SERPL-MCNC: 0.99 MG/DL
GLUCOSE SERPL-MCNC: 97 MG/DL
POTASSIUM SERPL-SCNC: 4.7 MMOL/L
PROT SERPL-MCNC: 6.6 G/DL
SODIUM SERPL-SCNC: 139 MMOL/L

## 2019-07-19 ENCOUNTER — APPOINTMENT (OUTPATIENT)
Dept: HEMATOLOGY ONCOLOGY | Facility: CLINIC | Age: 64
End: 2019-07-19

## 2019-07-19 ENCOUNTER — LABORATORY RESULT (OUTPATIENT)
Age: 64
End: 2019-07-19

## 2019-07-19 ENCOUNTER — RESULT REVIEW (OUTPATIENT)
Age: 64
End: 2019-07-19

## 2019-07-19 LAB
BASOPHILS # BLD AUTO: 0.1 K/UL — SIGNIFICANT CHANGE UP (ref 0–0.2)
EOSINOPHIL # BLD AUTO: 0.1 K/UL — SIGNIFICANT CHANGE UP (ref 0–0.5)
HCT VFR BLD CALC: 25.9 % — LOW (ref 39–50)
HGB BLD-MCNC: 7.9 G/DL — LOW (ref 13–17)
LYMPHOCYTES # BLD AUTO: 10.4 K/UL — HIGH (ref 1–3.3)
LYMPHOCYTES # BLD AUTO: 78 % — HIGH (ref 13–44)
LYMPHOCYTES # SPEC AUTO: 2 % — HIGH (ref 0–0)
MCHC RBC-ENTMCNC: 26.4 PG — LOW (ref 27–34)
MCHC RBC-ENTMCNC: 30.4 G/DL — LOW (ref 32–36)
MCV RBC AUTO: 86.8 FL — SIGNIFICANT CHANGE UP (ref 80–100)
MONOCYTES # BLD AUTO: 0.3 K/UL — SIGNIFICANT CHANGE UP (ref 0–0.9)
MONOCYTES NFR BLD AUTO: 8 % — SIGNIFICANT CHANGE UP (ref 2–14)
NEUTROPHILS # BLD AUTO: 1.3 K/UL — LOW (ref 1.8–7.4)
NEUTROPHILS NFR BLD AUTO: 12 % — LOW (ref 43–77)
PLAT MORPH BLD: NORMAL — SIGNIFICANT CHANGE UP
PLATELET # BLD AUTO: 82 K/UL — LOW (ref 150–400)
RBC # BLD: 2.98 M/UL — LOW (ref 4.2–5.8)
RBC # FLD: 17.3 % — HIGH (ref 10.3–14.5)
RBC BLD AUTO: SIGNIFICANT CHANGE UP
WBC # BLD: 12.2 K/UL — HIGH (ref 3.8–10.5)
WBC # FLD AUTO: 12.2 K/UL — HIGH (ref 3.8–10.5)

## 2019-07-19 RX ORDER — TENOFOVIR ALAFENAMIDE 25 MG/1
25 TABLET ORAL
Qty: 90 | Refills: 2 | Status: COMPLETED | COMMUNITY
Start: 2019-07-18 | End: 2019-07-19

## 2019-07-20 ENCOUNTER — APPOINTMENT (OUTPATIENT)
Dept: INTERNAL MEDICINE | Facility: CLINIC | Age: 64
End: 2019-07-20
Payer: MEDICAID

## 2019-07-20 VITALS
WEIGHT: 152 LBS | HEART RATE: 70 BPM | DIASTOLIC BLOOD PRESSURE: 66 MMHG | BODY MASS INDEX: 21.76 KG/M2 | OXYGEN SATURATION: 96 % | TEMPERATURE: 98 F | SYSTOLIC BLOOD PRESSURE: 130 MMHG | HEIGHT: 70 IN

## 2019-07-20 DIAGNOSIS — R73.09 OTHER ABNORMAL GLUCOSE: ICD-10-CM

## 2019-07-20 DIAGNOSIS — Z86.79 PERSONAL HISTORY OF OTHER DISEASES OF THE CIRCULATORY SYSTEM: ICD-10-CM

## 2019-07-20 DIAGNOSIS — Z11.59 ENCOUNTER FOR SCREENING FOR OTHER VIRAL DISEASES: ICD-10-CM

## 2019-07-20 DIAGNOSIS — E87.0 HYPEROSMOLALITY AND HYPERNATREMIA: ICD-10-CM

## 2019-07-20 PROCEDURE — 99214 OFFICE O/P EST MOD 30 MIN: CPT

## 2019-07-20 PROCEDURE — 99406 BEHAV CHNG SMOKING 3-10 MIN: CPT

## 2019-07-20 NOTE — HEALTH RISK ASSESSMENT
[] : Yes [No] : In the past 12 months have you used drugs other than those required for medical reasons? No [Audit-CScore] : 0

## 2019-07-20 NOTE — PLAN
[FreeTextEntry1] : refused vaccines today\par refused CT for lung cancer screen\par smoking cessation discussed- 3 min

## 2019-07-20 NOTE — HISTORY OF PRESENT ILLNESS
[FreeTextEntry1] : anemia [de-identified] : CLL- pt was started on meds by hematology\par anemia, low PLT- 7/19- pt had gone to the ER- pt got transfusion.  fatigue is intermittent.  no CP or SOB. \par US- splenomegaly\par smoker- pt states he is trying to quit smoking.  pt using nicorette patches. pt will to try smoking cessation prog.  occasional smokes w/in 30 min\par IVDA- on methadone program\par HC RNA- neg.  HBs ab- positive.\par enlarged  thyroid- pt forgot to get US thyroid.\par dupuytrens contracture- pt doesn't want to f/u on this now.

## 2019-07-20 NOTE — COUNSELING
[Discussed Risks and Advised to Quit Smoking] : Discussed risks and advised to quit smoking [Discussed Cessation Medication] : cessation medication was discussed [Discussed Cessation Strategies] : cessation strategies were discussed [Quit Smoking] : Quit Smoking [Participate in Class] : Participate in class

## 2019-07-21 NOTE — DISCUSSION/SUMMARY
[FreeTextEntry1] : Patient with CLL here for staging bone marrow aspiration and biopsy.\par Plan call office 3-5 business days for results

## 2019-07-21 NOTE — PROCEDURE
[Bone Marrow Aspiration] : bone marrow aspiration  [Bone Marrow Biopsy] : bone marrow biopsy [Patient] : the patient [Other: ___] : [unfilled] [Verbal Consent Obtained] : verbal consent was obtained prior to the procedure [Procedure verified and consent obtained] : procedure verified and consent obtained [Laterality verified and correct site marked] : laterality verified and correct site marked [Correct positioning] : correct positioning [Right] : site: right [The right posterior iliac crest was prepped with betadine and draped, using sterile technique.] : The right posterior iliac crest was prepped with betadine and draped, using sterile technique. [Prone] : prone [Lidocaine was injected and into the periosteum overlying the site.] : Lidocaine was injected and into the periosteum overlying the site. [Aspirate] : aspirate [Cytogenetics] : cytogenetics [Biopsy] : biopsy [FISH] : FISH [Flow Cytometry] : flow cytometry [] : The patient was instructed to remove the bandage the following AM. The patient may bathe. Acetaminophen may be taken for discomfort, as per package directions.If there are any other problems, the patient was instructed to call the office. The patient verbalized understanding, and is aware of the office contact numbers. [FreeTextEntry1] : CLL staging [FreeTextEntry2] : after written consent patient prepped and draped using aspetic technique area , 10 cc of 2% lidocaine injection to RPIC by Away NP using aspetic technique aspiration obtained with ease , additional 4 cc of 1% Lidocaine given and biopsy then obtained point pressure applied then patient positioned to apply direct pressure sterile dressing applied . discharge instructions discussed .  labels verfied for accuracy by A Way NP and TERRY Huff tolerated procedure well.

## 2019-07-22 LAB — CHROM ANALY OVERALL INTERP SPEC-IMP: SIGNIFICANT CHANGE UP

## 2019-07-25 ENCOUNTER — APPOINTMENT (OUTPATIENT)
Dept: HEMATOLOGY ONCOLOGY | Facility: CLINIC | Age: 64
End: 2019-07-25

## 2019-07-25 ENCOUNTER — OUTPATIENT (OUTPATIENT)
Dept: OUTPATIENT SERVICES | Facility: HOSPITAL | Age: 64
LOS: 1 days | Discharge: HOME | End: 2019-07-25

## 2019-07-25 DIAGNOSIS — C91.10 CHRONIC LYMPHOCYTIC LEUKEMIA OF B-CELL TYPE NOT HAVING ACHIEVED REMISSION: ICD-10-CM

## 2019-07-26 ENCOUNTER — RESULT REVIEW (OUTPATIENT)
Age: 64
End: 2019-07-26

## 2019-07-26 ENCOUNTER — APPOINTMENT (OUTPATIENT)
Dept: HEMATOLOGY ONCOLOGY | Facility: CLINIC | Age: 64
End: 2019-07-26
Payer: MEDICAID

## 2019-07-26 ENCOUNTER — LABORATORY RESULT (OUTPATIENT)
Age: 64
End: 2019-07-26

## 2019-07-26 VITALS
DIASTOLIC BLOOD PRESSURE: 61 MMHG | SYSTOLIC BLOOD PRESSURE: 129 MMHG | WEIGHT: 154.98 LBS | HEART RATE: 70 BPM | BODY MASS INDEX: 22.24 KG/M2 | OXYGEN SATURATION: 97 % | TEMPERATURE: 98.7 F | RESPIRATION RATE: 16 BRPM

## 2019-07-26 LAB
ANISOCYTOSIS BLD QL: SLIGHT — SIGNIFICANT CHANGE UP
BASO STIPL BLD QL SMEAR: PRESENT — SIGNIFICANT CHANGE UP
BASOPHILS # BLD AUTO: 0.2 K/UL — SIGNIFICANT CHANGE UP (ref 0–0.2)
ELLIPTOCYTES BLD QL SMEAR: SLIGHT — SIGNIFICANT CHANGE UP
EOSINOPHIL # BLD AUTO: 0.2 K/UL — SIGNIFICANT CHANGE UP (ref 0–0.5)
HCT VFR BLD CALC: 26.7 % — LOW (ref 39–50)
HGB BLD-MCNC: 8.2 G/DL — LOW (ref 13–17)
LYMPHOCYTES # BLD AUTO: 37.4 K/UL — HIGH (ref 1–3.3)
LYMPHOCYTES # BLD AUTO: 85 % — HIGH (ref 13–44)
LYMPHOCYTES # SPEC AUTO: 8 % — HIGH (ref 0–0)
MACROCYTES BLD QL: SLIGHT — SIGNIFICANT CHANGE UP
MCHC RBC-ENTMCNC: 27.3 PG — SIGNIFICANT CHANGE UP (ref 27–34)
MCHC RBC-ENTMCNC: 30.5 G/DL — LOW (ref 32–36)
MCV RBC AUTO: 89.4 FL — SIGNIFICANT CHANGE UP (ref 80–100)
MONOCYTES # BLD AUTO: 0.5 K/UL — SIGNIFICANT CHANGE UP (ref 0–0.9)
MONOCYTES NFR BLD AUTO: 6 % — SIGNIFICANT CHANGE UP (ref 2–14)
NEUTROPHILS # BLD AUTO: 2 K/UL — SIGNIFICANT CHANGE UP (ref 1.8–7.4)
NEUTROPHILS NFR BLD AUTO: 1 % — LOW (ref 43–77)
PLAT MORPH BLD: ABNORMAL
PLATELET # BLD AUTO: 72 K/UL — LOW (ref 150–400)
POIKILOCYTOSIS BLD QL AUTO: SLIGHT — SIGNIFICANT CHANGE UP
POLYCHROMASIA BLD QL SMEAR: SLIGHT — SIGNIFICANT CHANGE UP
RBC # BLD: 2.99 M/UL — LOW (ref 4.2–5.8)
RBC # FLD: 18.9 % — HIGH (ref 10.3–14.5)
RBC BLD AUTO: ABNORMAL
WBC # BLD: 40.2 K/UL — CRITICAL HIGH (ref 3.8–10.5)
WBC # FLD AUTO: 40.2 K/UL — CRITICAL HIGH (ref 3.8–10.5)

## 2019-07-26 PROCEDURE — 99213 OFFICE O/P EST LOW 20 MIN: CPT

## 2019-07-26 RX ORDER — TENOFOVIR DISOPROXIL FUMARATE 300 MG/1
300 TABLET ORAL DAILY
Qty: 30 | Refills: 5 | Status: DISCONTINUED | COMMUNITY
Start: 2019-07-19 | End: 2019-07-26

## 2019-07-26 RX ORDER — METHADONE HYDROCHLORIDE 10 MG/1
10 TABLET ORAL
Refills: 0 | Status: ACTIVE | COMMUNITY

## 2019-07-29 ENCOUNTER — RESULT REVIEW (OUTPATIENT)
Age: 64
End: 2019-07-29

## 2019-07-29 ENCOUNTER — LABORATORY RESULT (OUTPATIENT)
Age: 64
End: 2019-07-29

## 2019-07-29 ENCOUNTER — APPOINTMENT (OUTPATIENT)
Dept: INFUSION THERAPY | Facility: HOSPITAL | Age: 64
End: 2019-07-29

## 2019-07-29 LAB
ANISOCYTOSIS BLD QL: SLIGHT — SIGNIFICANT CHANGE UP
BASO STIPL BLD QL SMEAR: PRESENT — SIGNIFICANT CHANGE UP
BASOPHILS # BLD AUTO: 0.4 K/UL — HIGH (ref 0–0.2)
BASOPHILS NFR BLD AUTO: 0.9 % — SIGNIFICANT CHANGE UP (ref 0–2)
ELLIPTOCYTES BLD QL SMEAR: SLIGHT — SIGNIFICANT CHANGE UP
EOSINOPHIL # BLD AUTO: 0.4 K/UL — SIGNIFICANT CHANGE UP (ref 0–0.5)
EOSINOPHIL NFR BLD AUTO: 0.9 % — SIGNIFICANT CHANGE UP (ref 0–6)
HCT VFR BLD CALC: 27.4 % — LOW (ref 39–50)
HGB BLD-MCNC: 8.4 G/DL — LOW (ref 13–17)
LYMPHOCYTES # BLD AUTO: 38.1 K/UL — HIGH (ref 1–3.3)
LYMPHOCYTES # BLD AUTO: 93 % — HIGH (ref 13–44)
MACROCYTES BLD QL: SLIGHT — SIGNIFICANT CHANGE UP
MCHC RBC-ENTMCNC: 27.6 PG — SIGNIFICANT CHANGE UP (ref 27–34)
MCHC RBC-ENTMCNC: 30.7 G/DL — LOW (ref 32–36)
MCV RBC AUTO: 89.7 FL — SIGNIFICANT CHANGE UP (ref 80–100)
MONOCYTES # BLD AUTO: 0.5 K/UL — SIGNIFICANT CHANGE UP (ref 0–0.9)
MONOCYTES NFR BLD AUTO: 2 % — SIGNIFICANT CHANGE UP (ref 2–14)
NEUTROPHILS # BLD AUTO: 1.9 K/UL — SIGNIFICANT CHANGE UP (ref 1.8–7.4)
NEUTROPHILS NFR BLD AUTO: 5 % — LOW (ref 43–77)
PLAT MORPH BLD: NORMAL — SIGNIFICANT CHANGE UP
PLATELET # BLD AUTO: 90 K/UL — LOW (ref 150–400)
POIKILOCYTOSIS BLD QL AUTO: SLIGHT — SIGNIFICANT CHANGE UP
POLYCHROMASIA BLD QL SMEAR: SLIGHT — SIGNIFICANT CHANGE UP
RBC # BLD: 3.06 M/UL — LOW (ref 4.2–5.8)
RBC # FLD: 19.5 % — HIGH (ref 10.3–14.5)
RBC BLD AUTO: ABNORMAL
WBC # BLD: 41.3 K/UL — CRITICAL HIGH (ref 3.8–10.5)
WBC # FLD AUTO: 41.3 K/UL — CRITICAL HIGH (ref 3.8–10.5)

## 2019-07-30 DIAGNOSIS — R11.2 NAUSEA WITH VOMITING, UNSPECIFIED: ICD-10-CM

## 2019-07-30 DIAGNOSIS — Z51.11 ENCOUNTER FOR ANTINEOPLASTIC CHEMOTHERAPY: ICD-10-CM

## 2019-08-05 ENCOUNTER — RESULT REVIEW (OUTPATIENT)
Age: 64
End: 2019-08-05

## 2019-08-05 ENCOUNTER — APPOINTMENT (OUTPATIENT)
Dept: INFUSION THERAPY | Facility: HOSPITAL | Age: 64
End: 2019-08-05

## 2019-08-05 ENCOUNTER — LABORATORY RESULT (OUTPATIENT)
Age: 64
End: 2019-08-05

## 2019-08-05 LAB
ANISOCYTOSIS BLD QL: SLIGHT — SIGNIFICANT CHANGE UP
ELLIPTOCYTES BLD QL SMEAR: SLIGHT — SIGNIFICANT CHANGE UP
EOSINOPHIL # BLD AUTO: 0.3 K/UL — SIGNIFICANT CHANGE UP (ref 0–0.5)
EOSINOPHIL NFR BLD AUTO: 1 % — SIGNIFICANT CHANGE UP (ref 0–6)
HCT VFR BLD CALC: 27.1 % — LOW (ref 39–50)
HGB BLD-MCNC: 8.4 G/DL — LOW (ref 13–17)
LYMPHOCYTES # BLD AUTO: 34.7 K/UL — HIGH (ref 1–3.3)
LYMPHOCYTES # BLD AUTO: 84 % — HIGH (ref 13–44)
LYMPHOCYTES # SPEC AUTO: 3 % — HIGH (ref 0–0)
MACROCYTES BLD QL: SLIGHT — SIGNIFICANT CHANGE UP
MCHC RBC-ENTMCNC: 28.2 PG — SIGNIFICANT CHANGE UP (ref 27–34)
MCHC RBC-ENTMCNC: 31.1 G/DL — LOW (ref 32–36)
MCV RBC AUTO: 90.6 FL — SIGNIFICANT CHANGE UP (ref 80–100)
MONOCYTES # BLD AUTO: 0.5 K/UL — SIGNIFICANT CHANGE UP (ref 0–0.9)
MONOCYTES NFR BLD AUTO: 2 % — SIGNIFICANT CHANGE UP (ref 2–14)
NEUTROPHILS # BLD AUTO: 2.1 K/UL — SIGNIFICANT CHANGE UP (ref 1.8–7.4)
NEUTROPHILS NFR BLD AUTO: 10 % — LOW (ref 43–77)
PLAT MORPH BLD: NORMAL — SIGNIFICANT CHANGE UP
PLATELET # BLD AUTO: 78 K/UL — LOW (ref 150–400)
POIKILOCYTOSIS BLD QL AUTO: SLIGHT — SIGNIFICANT CHANGE UP
POLYCHROMASIA BLD QL SMEAR: SLIGHT — SIGNIFICANT CHANGE UP
RBC # BLD: 2.99 M/UL — LOW (ref 4.2–5.8)
RBC # FLD: 19.7 % — HIGH (ref 10.3–14.5)
RBC BLD AUTO: ABNORMAL
WBC # BLD: 37.9 K/UL — HIGH (ref 3.8–10.5)
WBC # FLD AUTO: 37.9 K/UL — HIGH (ref 3.8–10.5)

## 2019-08-10 LAB
HBV DNA # SERPL NAA+PROBE: NOT DETECTED IU/ML
HEPB DNA PCR LOG: NOT DETECTED LOGIU/ML

## 2019-08-12 ENCOUNTER — RESULT REVIEW (OUTPATIENT)
Age: 64
End: 2019-08-12

## 2019-08-12 ENCOUNTER — FORM ENCOUNTER (OUTPATIENT)
Age: 64
End: 2019-08-12

## 2019-08-12 ENCOUNTER — APPOINTMENT (OUTPATIENT)
Dept: INFUSION THERAPY | Facility: HOSPITAL | Age: 64
End: 2019-08-12

## 2019-08-12 ENCOUNTER — LABORATORY RESULT (OUTPATIENT)
Age: 64
End: 2019-08-12

## 2019-08-12 LAB
ANISOCYTOSIS BLD QL: SLIGHT — SIGNIFICANT CHANGE UP
BASO STIPL BLD QL SMEAR: PRESENT — SIGNIFICANT CHANGE UP
BASOPHILS # BLD AUTO: 0.1 K/UL — SIGNIFICANT CHANGE UP (ref 0–0.2)
ELLIPTOCYTES BLD QL SMEAR: SLIGHT — SIGNIFICANT CHANGE UP
EOSINOPHIL # BLD AUTO: 0.3 K/UL — SIGNIFICANT CHANGE UP (ref 0–0.5)
HCT VFR BLD CALC: 28.4 % — LOW (ref 39–50)
HGB BLD-MCNC: 8.9 G/DL — LOW (ref 13–17)
HYPOCHROMIA BLD QL: SLIGHT — SIGNIFICANT CHANGE UP
LYMPHOCYTES # BLD AUTO: 16.9 K/UL — HIGH (ref 1–3.3)
LYMPHOCYTES # BLD AUTO: 80 % — HIGH (ref 13–44)
MACROCYTES BLD QL: SLIGHT — SIGNIFICANT CHANGE UP
MCHC RBC-ENTMCNC: 28.4 PG — SIGNIFICANT CHANGE UP (ref 27–34)
MCHC RBC-ENTMCNC: 31.3 G/DL — LOW (ref 32–36)
MCV RBC AUTO: 90.9 FL — SIGNIFICANT CHANGE UP (ref 80–100)
MONOCYTES # BLD AUTO: 0.4 K/UL — SIGNIFICANT CHANGE UP (ref 0–0.9)
MONOCYTES NFR BLD AUTO: 5 % — SIGNIFICANT CHANGE UP (ref 2–14)
NEUTROPHILS # BLD AUTO: 2.6 K/UL — SIGNIFICANT CHANGE UP (ref 1.8–7.4)
NEUTROPHILS NFR BLD AUTO: 15 % — LOW (ref 43–77)
NEUTS HYPERSEG # BLD: PRESENT — SIGNIFICANT CHANGE UP
PLAT MORPH BLD: NORMAL — SIGNIFICANT CHANGE UP
PLATELET # BLD AUTO: 70 K/UL — LOW (ref 150–400)
POIKILOCYTOSIS BLD QL AUTO: SIGNIFICANT CHANGE UP
POLYCHROMASIA BLD QL SMEAR: SLIGHT — SIGNIFICANT CHANGE UP
RBC # BLD: 3.12 M/UL — LOW (ref 4.2–5.8)
RBC # FLD: 19.3 % — HIGH (ref 10.3–14.5)
RBC BLD AUTO: ABNORMAL
SCHISTOCYTES BLD QL AUTO: SLIGHT — SIGNIFICANT CHANGE UP
WBC # BLD: 20.2 K/UL — HIGH (ref 3.8–10.5)
WBC # FLD AUTO: 20.2 K/UL — HIGH (ref 3.8–10.5)

## 2019-08-12 NOTE — HISTORY OF PRESENT ILLNESS
[de-identified] : Mr. Meyer is a 63 year-old male who was diagnosed with CLL in 2003. He has been followed intermittently and was loss of a f/u with hematology. When seen on 5/20/03 the Wbc was 33.5, and was described as progressive.He was seen at the [facility] by Dr. Schulte. \par He was also seen at [facility] ED on 6/14/19 with severe anemia, Hgb of 6.8.  At that time he reported feeling severe fatigue and weakness for years, the Wbc was 13.54, Hgb 6.8, MCV 94.8, Plt 87K, the Anc was 1.22. The CMP showed no abnormalities. 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 He had dyspnea pretransfusion which has improved. He described weight loss back to 2003 when first diagnosed and has more recently stabilized for the last two years. 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 \par The flow in [facility] ED was sent from [facility] and showed monotypic B-cells (26% of cells), (+) kappa, CD19 CD20, CD5, partial FMC-7; (-) for CD10 CD23 CD38, findings were consistent with CD5 positive B-cell lymphoproliferative disorder. \par The records are incomplete but has been occasionally followed up by Dr. Schulte a hematologist in [facility].\par He c/o significant fatigue that has been worsening over the years.  [de-identified] : Waldenstrom lymphoma - reviewed with patient diagnosis and treatment plan Imbruvica daily with Rituxan weekly x 4 weeks then again at week 17 , reviewed potential side effects .\par HEP B positive [ to start Viread

## 2019-08-12 NOTE — ASSESSMENT
[FreeTextEntry1] : patient with Waldenstrom Lymphoma to begin treatment with Rituxan and Imbruvica\par He is committed to the program and remains on his Methadone program. Smoking cessation  was discussed, and he is considering trying to stop smoking\par plan: start Imbruvica \par schedule Rituxan weekly x4 \par HEP B core positive to start Viread daily.

## 2019-08-12 NOTE — PHYSICAL EXAM
[Fully active, able to carry on all pre-disease performance without restriction] : Status 0 - Fully active, able to carry on all pre-disease performance without restriction [Normal] : affect appropriate [Thin] : thin [de-identified] : no palpable lymph nodes cervical [de-identified] : smoker 1 pack a day [de-identified] : groin palpable nodes bilateral

## 2019-08-13 ENCOUNTER — OUTPATIENT (OUTPATIENT)
Dept: OUTPATIENT SERVICES | Facility: HOSPITAL | Age: 64
LOS: 1 days | End: 2019-08-13
Payer: MEDICAID

## 2019-08-13 ENCOUNTER — APPOINTMENT (OUTPATIENT)
Dept: ULTRASOUND IMAGING | Facility: IMAGING CENTER | Age: 64
End: 2019-08-13
Payer: MEDICAID

## 2019-08-13 DIAGNOSIS — C85.80 OTHER SPECIFIED TYPES OF NON-HODGKIN LYMPHOMA, UNSPECIFIED SITE: ICD-10-CM

## 2019-08-13 DIAGNOSIS — E04.9 NONTOXIC GOITER, UNSPECIFIED: ICD-10-CM

## 2019-08-13 PROCEDURE — 76536 US EXAM OF HEAD AND NECK: CPT

## 2019-08-13 PROCEDURE — 76536 US EXAM OF HEAD AND NECK: CPT | Mod: 26

## 2019-08-19 ENCOUNTER — LABORATORY RESULT (OUTPATIENT)
Age: 64
End: 2019-08-19

## 2019-08-19 ENCOUNTER — RESULT REVIEW (OUTPATIENT)
Age: 64
End: 2019-08-19

## 2019-08-19 ENCOUNTER — APPOINTMENT (OUTPATIENT)
Dept: INFUSION THERAPY | Facility: HOSPITAL | Age: 64
End: 2019-08-19

## 2019-08-19 LAB
BASOPHILS # BLD AUTO: 0.1 K/UL — SIGNIFICANT CHANGE UP (ref 0–0.2)
BASOPHILS NFR BLD AUTO: 1 % — SIGNIFICANT CHANGE UP (ref 0–2)
EOSINOPHIL # BLD AUTO: 0.2 K/UL — SIGNIFICANT CHANGE UP (ref 0–0.5)
EOSINOPHIL NFR BLD AUTO: 1 % — SIGNIFICANT CHANGE UP (ref 0–6)
HCT VFR BLD CALC: 32.9 % — LOW (ref 39–50)
HGB BLD-MCNC: 10.5 G/DL — LOW (ref 13–17)
LYMPHOCYTES # BLD AUTO: 17.4 K/UL — HIGH (ref 1–3.3)
LYMPHOCYTES # BLD AUTO: 71 % — HIGH (ref 13–44)
LYMPHOCYTES # SPEC AUTO: 1 % — HIGH (ref 0–0)
MCHC RBC-ENTMCNC: 29.2 PG — SIGNIFICANT CHANGE UP (ref 27–34)
MCHC RBC-ENTMCNC: 32 G/DL — SIGNIFICANT CHANGE UP (ref 32–36)
MCV RBC AUTO: 91.5 FL — SIGNIFICANT CHANGE UP (ref 80–100)
MONOCYTES # BLD AUTO: 0.7 K/UL — SIGNIFICANT CHANGE UP (ref 0–0.9)
MONOCYTES NFR BLD AUTO: 5 % — SIGNIFICANT CHANGE UP (ref 2–14)
NEUTROPHILS # BLD AUTO: 3.6 K/UL — SIGNIFICANT CHANGE UP (ref 1.8–7.4)
NEUTROPHILS NFR BLD AUTO: 21 % — LOW (ref 43–77)
PLAT MORPH BLD: NORMAL — SIGNIFICANT CHANGE UP
PLATELET # BLD AUTO: 97 K/UL — LOW (ref 150–400)
RBC # BLD: 3.6 M/UL — LOW (ref 4.2–5.8)
RBC # FLD: 19.7 % — HIGH (ref 10.3–14.5)
RBC BLD AUTO: SIGNIFICANT CHANGE UP
SMUDGE CELLS # BLD: PRESENT — SIGNIFICANT CHANGE UP
WBC # BLD: 22 K/UL — HIGH (ref 3.8–10.5)
WBC # FLD AUTO: 22 K/UL — HIGH (ref 3.8–10.5)

## 2019-08-20 ENCOUNTER — RESULT REVIEW (OUTPATIENT)
Age: 64
End: 2019-08-20

## 2019-08-20 DIAGNOSIS — E04.9 NONTOXIC GOITER, UNSPECIFIED: ICD-10-CM

## 2019-08-21 ENCOUNTER — APPOINTMENT (OUTPATIENT)
Dept: HEMATOLOGY ONCOLOGY | Facility: CLINIC | Age: 64
End: 2019-08-21

## 2019-08-27 ENCOUNTER — OUTPATIENT (OUTPATIENT)
Dept: OUTPATIENT SERVICES | Facility: HOSPITAL | Age: 64
LOS: 1 days | Discharge: HOME | End: 2019-08-27

## 2019-08-27 DIAGNOSIS — C91.10 CHRONIC LYMPHOCYTIC LEUKEMIA OF B-CELL TYPE NOT HAVING ACHIEVED REMISSION: ICD-10-CM

## 2019-08-29 ENCOUNTER — RESULT REVIEW (OUTPATIENT)
Age: 64
End: 2019-08-29

## 2019-08-29 ENCOUNTER — APPOINTMENT (OUTPATIENT)
Dept: HEMATOLOGY ONCOLOGY | Facility: CLINIC | Age: 64
End: 2019-08-29
Payer: MEDICAID

## 2019-08-29 VITALS
RESPIRATION RATE: 17 BRPM | BODY MASS INDEX: 22.93 KG/M2 | OXYGEN SATURATION: 98 % | WEIGHT: 159.83 LBS | DIASTOLIC BLOOD PRESSURE: 64 MMHG | TEMPERATURE: 98.3 F | SYSTOLIC BLOOD PRESSURE: 104 MMHG | HEART RATE: 66 BPM

## 2019-08-29 LAB
BASOPHILS # BLD AUTO: 0.1 K/UL — SIGNIFICANT CHANGE UP (ref 0–0.2)
EOSINOPHIL # BLD AUTO: 0.3 K/UL — SIGNIFICANT CHANGE UP (ref 0–0.5)
EOSINOPHIL NFR BLD AUTO: 1 % — SIGNIFICANT CHANGE UP (ref 0–6)
HCT VFR BLD CALC: 33.4 % — LOW (ref 39–50)
HGB BLD-MCNC: 10.5 G/DL — LOW (ref 13–17)
LYMPHOCYTES # BLD AUTO: 13.1 K/UL — HIGH (ref 1–3.3)
LYMPHOCYTES # BLD AUTO: 82 % — HIGH (ref 13–44)
LYMPHOCYTES # SPEC AUTO: 1 % — HIGH (ref 0–0)
MCHC RBC-ENTMCNC: 29 PG — SIGNIFICANT CHANGE UP (ref 27–34)
MCHC RBC-ENTMCNC: 31.5 G/DL — LOW (ref 32–36)
MCV RBC AUTO: 92 FL — SIGNIFICANT CHANGE UP (ref 80–100)
MONOCYTES # BLD AUTO: 0.5 K/UL — SIGNIFICANT CHANGE UP (ref 0–0.9)
MONOCYTES NFR BLD AUTO: 2 % — SIGNIFICANT CHANGE UP (ref 2–14)
NEUTROPHILS # BLD AUTO: 2.4 K/UL — SIGNIFICANT CHANGE UP (ref 1.8–7.4)
NEUTROPHILS NFR BLD AUTO: 14 % — LOW (ref 43–77)
PLAT MORPH BLD: NORMAL — SIGNIFICANT CHANGE UP
PLATELET # BLD AUTO: 87 K/UL — LOW (ref 150–400)
RBC # BLD: 3.63 M/UL — LOW (ref 4.2–5.8)
RBC # FLD: 18.6 % — HIGH (ref 10.3–14.5)
RBC BLD AUTO: SIGNIFICANT CHANGE UP
WBC # BLD: 16.3 K/UL — HIGH (ref 3.8–10.5)
WBC # FLD AUTO: 16.3 K/UL — HIGH (ref 3.8–10.5)

## 2019-08-29 PROCEDURE — 99214 OFFICE O/P EST MOD 30 MIN: CPT

## 2019-08-29 RX ORDER — ALLOPURINOL 300 MG/1
300 TABLET ORAL
Qty: 7 | Refills: 0 | Status: COMPLETED | COMMUNITY
Start: 2019-07-26 | End: 2019-08-29

## 2019-08-30 LAB
ALBUMIN SERPL ELPH-MCNC: 4 G/DL
ALP BLD-CCNC: 94 U/L
ALT SERPL-CCNC: 7 U/L
ANION GAP SERPL CALC-SCNC: 12 MMOL/L
AST SERPL-CCNC: 12 U/L
BILIRUB SERPL-MCNC: 0.4 MG/DL
BUN SERPL-MCNC: 15 MG/DL
CALCIUM SERPL-MCNC: 8.5 MG/DL
CHLORIDE SERPL-SCNC: 101 MMOL/L
CO2 SERPL-SCNC: 26 MMOL/L
CREAT SERPL-MCNC: 1 MG/DL
GLUCOSE SERPL-MCNC: 92 MG/DL
LDH SERPL-CCNC: 137 U/L
POTASSIUM SERPL-SCNC: 4.3 MMOL/L
PROT SERPL-MCNC: 6.5 G/DL
SODIUM SERPL-SCNC: 139 MMOL/L

## 2019-09-02 NOTE — HISTORY OF PRESENT ILLNESS
[Treatment Protocol] : Treatment Protocol [de-identified] : Mr. Meyer is a 63 year-old male who was diagnosed with CLL in 2003. He has been followed intermittently and was loss of a f/u with hematology. When seen on 5/20/03 the Wbc was 33.5, and was described as progressive.He was seen at the [facility] by Dr. Schulte. \par He was also seen at [facility] ED on 6/14/19 with severe anemia, Hgb of 6.8.  At that time he reported feeling severe fatigue and weakness for years, the Wbc was 13.54, Hgb 6.8, MCV 94.8, Plt 87K, the Anc was 1.22. The CMP showed no abnormalities. 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 He had dyspnea pretransfusion which has improved. He described weight loss back to 2003 when first diagnosed and has more recently stabilized for the last two years. 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 \par The flow in [facility] ED was sent from [facility] and showed monotypic B-cells (26% of cells), (+) kappa, CD19 CD20, CD5, partial FMC-7; (-) for CD10 CD23 CD38, findings were consistent with CD5 positive B-cell lymphoproliferative disorder. \par The records are incomplete but has been occasionally followed up by Dr. Schulte a hematologist in [facility].\par He c/o significant fatigue that has been worsening over the years.  [FreeTextEntry1] : Imbruvica and weekly Rituxan x 4  [de-identified] : Waldenstrom lymphoma - reviewed with patient diagnosis and treatment plan Imbruvica daily with Rituxan weekly x 4 weeks then again at week 17 , reviewed potential side effects .tolerated treatment \par HEP B positive on Viread

## 2019-09-02 NOTE — ASSESSMENT
[Palliative] : Goals of care discussed with patient: Palliative [FreeTextEntry1] : patient with Waldenstrom Lymphoma to begin treatment with Rituxan and Imbruvica\par He is committed to the program and remains on his Methadone program. Smoking cessation  was discussed, and he is considering trying to stop smoking\par plan: continue Imbruvica \par s/p  Rituxan weekly x4 due again in November\par HEP B core positive on Viread daily

## 2019-09-05 NOTE — ASSESSMENT
[Palliative] : Goals of care discussed with patient: Palliative [FreeTextEntry1] : patient with Waldenstrom Lymphoma to begin treatment with Rituxan and Imbruvica\par He is committed to the program and remains on his Methadone program. Smoking cessation  was discussed, and he is considering trying to stop smoking\par plan: ciontinue Imbruvica \par s/p  Rituxan weekly x4 due again in November\par HEP B core positive on Viread daily.\par DErm consult regarding new rash\par f/u 1 month

## 2019-09-05 NOTE — REVIEW OF SYSTEMS
[Fatigue] : fatigue [Negative] : Allergic/Immunologic [de-identified] : new rash papule arms chest and legs

## 2019-09-05 NOTE — HISTORY OF PRESENT ILLNESS
[Treatment Protocol] : Treatment Protocol [de-identified] : Mr. Meyer is a 63 year-old male who was diagnosed with CLL in 2003. He has been followed intermittently and was loss of a f/u with hematology. When seen on 5/20/03 the Wbc was 33.5, and was described as progressive.He was seen at the [facility] by Dr. Schulte. \par He was also seen at [facility] ED on 6/14/19 with severe anemia, Hgb of 6.8.  At that time he reported feeling severe fatigue and weakness for years, the Wbc was 13.54, Hgb 6.8, MCV 94.8, Plt 87K, the Anc was 1.22. The CMP showed no abnormalities. 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 He had dyspnea pretransfusion which has improved. He described weight loss back to 2003 when first diagnosed and has more recently stabilized for the last two years. 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 \par The records are incomplete but has been occasionally followed up by Dr. Schulte a hematologist in [facility].\par He c/o significant fatigue that has been worsening over the years.  [FreeTextEntry1] : Imbruvica and weekly Rituxan x 4  [de-identified] : Waldenstrom lymphoma - reviewed with patient diagnosis and treatment plan Imbruvica daily with Rituxan weekly x 4 weeks then again at week 17 , reviewed potential side effects .tolerated treatment \par HEP B positive on Viread  Home

## 2019-09-06 ENCOUNTER — LABORATORY RESULT (OUTPATIENT)
Age: 64
End: 2019-09-06

## 2019-09-06 ENCOUNTER — APPOINTMENT (OUTPATIENT)
Dept: DERMATOLOGY | Facility: CLINIC | Age: 64
End: 2019-09-06
Payer: MEDICAID

## 2019-09-06 VITALS — SYSTOLIC BLOOD PRESSURE: 140 MMHG | DIASTOLIC BLOOD PRESSURE: 60 MMHG

## 2019-09-06 DIAGNOSIS — R23.3 SPONTANEOUS ECCHYMOSES: ICD-10-CM

## 2019-09-06 LAB
APPEARANCE: CLEAR
BACTERIA: NEGATIVE
BILIRUBIN URINE: NEGATIVE
BLOOD URINE: NORMAL
COLOR: YELLOW
GLUCOSE QUALITATIVE U: NEGATIVE
HYALINE CASTS: 0 /LPF
KETONES URINE: NEGATIVE
LEUKOCYTE ESTERASE URINE: NEGATIVE
MICROSCOPIC-UA: NORMAL
NITRITE URINE: NEGATIVE
PH URINE: 5.5
PROTEIN URINE: NEGATIVE
RED BLOOD CELLS URINE: 3 /HPF
SPECIFIC GRAVITY URINE: 1.02
SQUAMOUS EPITHELIAL CELLS: 1 /HPF
UROBILINOGEN URINE: NORMAL
WHITE BLOOD CELLS URINE: 2 /HPF

## 2019-09-06 PROCEDURE — 99203 OFFICE O/P NEW LOW 30 MIN: CPT | Mod: 25

## 2019-09-06 PROCEDURE — 11104 PUNCH BX SKIN SINGLE LESION: CPT

## 2019-09-19 ENCOUNTER — APPOINTMENT (OUTPATIENT)
Dept: INTERNAL MEDICINE | Facility: CLINIC | Age: 64
End: 2019-09-19

## 2019-09-24 ENCOUNTER — RESULT REVIEW (OUTPATIENT)
Age: 64
End: 2019-09-24

## 2019-09-24 ENCOUNTER — APPOINTMENT (OUTPATIENT)
Dept: HEMATOLOGY ONCOLOGY | Facility: CLINIC | Age: 64
End: 2019-09-24
Payer: MEDICAID

## 2019-09-24 VITALS
DIASTOLIC BLOOD PRESSURE: 69 MMHG | OXYGEN SATURATION: 98 % | TEMPERATURE: 98.5 F | BODY MASS INDEX: 23.03 KG/M2 | HEART RATE: 70 BPM | RESPIRATION RATE: 16 BRPM | WEIGHT: 160.5 LBS | SYSTOLIC BLOOD PRESSURE: 137 MMHG

## 2019-09-24 DIAGNOSIS — D48.5 NEOPLASM OF UNCERTAIN BEHAVIOR OF SKIN: ICD-10-CM

## 2019-09-24 LAB
BASOPHILS # BLD AUTO: 0.1 K/UL — SIGNIFICANT CHANGE UP (ref 0–0.2)
EOSINOPHIL # BLD AUTO: 0.3 K/UL — SIGNIFICANT CHANGE UP (ref 0–0.5)
EOSINOPHIL NFR BLD AUTO: 4 % — SIGNIFICANT CHANGE UP (ref 0–6)
HCT VFR BLD CALC: 38.6 % — LOW (ref 39–50)
HGB BLD-MCNC: 12.3 G/DL — LOW (ref 13–17)
LYMPHOCYTES # BLD AUTO: 11.7 K/UL — HIGH (ref 1–3.3)
LYMPHOCYTES # BLD AUTO: 70 % — HIGH (ref 13–44)
MCHC RBC-ENTMCNC: 29.5 PG — SIGNIFICANT CHANGE UP (ref 27–34)
MCHC RBC-ENTMCNC: 31.8 G/DL — LOW (ref 32–36)
MCV RBC AUTO: 92.8 FL — SIGNIFICANT CHANGE UP (ref 80–100)
MONOCYTES # BLD AUTO: 0.5 K/UL — SIGNIFICANT CHANGE UP (ref 0–0.9)
MONOCYTES NFR BLD AUTO: 3 % — SIGNIFICANT CHANGE UP (ref 2–14)
NEUTROPHILS # BLD AUTO: 2.7 K/UL — SIGNIFICANT CHANGE UP (ref 1.8–7.4)
NEUTROPHILS NFR BLD AUTO: 23 % — LOW (ref 43–77)
PLAT MORPH BLD: NORMAL — SIGNIFICANT CHANGE UP
PLATELET # BLD AUTO: 82 K/UL — LOW (ref 150–400)
RBC # BLD: 4.15 M/UL — LOW (ref 4.2–5.8)
RBC # FLD: 16.5 % — HIGH (ref 10.3–14.5)
RBC BLD AUTO: SIGNIFICANT CHANGE UP
WBC # BLD: 15.3 K/UL — HIGH (ref 3.8–10.5)
WBC # FLD AUTO: 15.3 K/UL — HIGH (ref 3.8–10.5)

## 2019-09-24 PROCEDURE — 99214 OFFICE O/P EST MOD 30 MIN: CPT

## 2019-09-29 LAB
B2 MICROGLOB SERPL-MCNC: 4 MG/L
DEPRECATED KAPPA LC FREE/LAMBDA SER: 8.09 RATIO
IGA SER QL IEP: 75 MG/DL
IGG SER QL IEP: 890 MG/DL
IGM SER QL IEP: 697 MG/DL
KAPPA LC CSF-MCNC: 1.38 MG/DL
KAPPA LC SERPL-MCNC: 11.16 MG/DL
LDH SERPL-CCNC: 140 U/L
URATE SERPL-MCNC: 5.5 MG/DL

## 2019-10-04 NOTE — ASSESSMENT
[Palliative] : Goals of care discussed with patient: Palliative [FreeTextEntry1] : patient with Waldenstrom Lymphoma to begin treatment with Rituxan and Imbruvica\par He is committed to the program and remains on his Methadone program. Smoking cessation  was discussed, and he is considering trying to stop smoking\par plan: continue Imbruvica \par s/p  Rituxan weekly x4 due again in November\par HEP B core positive on Viread daily.\par f/u 1 month

## 2019-10-04 NOTE — HISTORY OF PRESENT ILLNESS
[Treatment Protocol] : Treatment Protocol [de-identified] : Mr. Meyer is a 63 year-old male who was diagnosed with CLL in 2003. He has been followed intermittently and was loss of a f/u with hematology. When seen on 5/20/03 the Wbc was 33.5, and was described as progressive.He was seen at the [facility] by Dr. Schulte. \par He was also seen at [facility] ED on 6/14/19 with severe anemia, Hgb of 6.8.  At that time he reported feeling severe fatigue and weakness for years, the Wbc was 13.54, Hgb 6.8, MCV 94.8, Plt 87K, the Anc was 1.22. The CMP showed no abnormalities. 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 He had dyspnea pretransfusion which has improved. He described weight loss back to 2003 when first diagnosed and has more recently stabilized for the last two years. 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 \par The records are incomplete but has been occasionally followed up by Dr. Schulte a hematologist in [facility].\par He c/o significant fatigue that has been worsening over the years.  [FreeTextEntry1] : Imbruvica and weekly Rituxan x 4  [de-identified] : Waldenstrom lymphoma -  maintained on Imbruvica  completed weekly Rituxan x 4 due again in November starting on 18th patient is aware . today WBC 15.3 HGB 12.3 plts 83 ALC 11.7\par HEP B positive on Viread \par rash generalized-  purpuric drug eruption  prescription sent  for topical steroid

## 2019-10-04 NOTE — PHYSICAL EXAM
[Fully active, able to carry on all pre-disease performance without restriction] : Status 0 - Fully active, able to carry on all pre-disease performance without restriction [Normal] : affect appropriate [de-identified] : skin rash sm pink eructations

## 2019-10-13 ENCOUNTER — EMERGENCY (EMERGENCY)
Facility: HOSPITAL | Age: 64
LOS: 1 days | Discharge: HOME | End: 2019-10-13
Admitting: PERSONAL EMERGENCY RESPONSE ATTENDANT
Payer: MEDICAID

## 2019-10-13 VITALS
RESPIRATION RATE: 16 BRPM | OXYGEN SATURATION: 95 % | SYSTOLIC BLOOD PRESSURE: 143 MMHG | TEMPERATURE: 98 F | HEART RATE: 77 BPM | DIASTOLIC BLOOD PRESSURE: 61 MMHG

## 2019-10-13 VITALS
SYSTOLIC BLOOD PRESSURE: 137 MMHG | HEART RATE: 66 BPM | TEMPERATURE: 98 F | RESPIRATION RATE: 15 BRPM | DIASTOLIC BLOOD PRESSURE: 71 MMHG | OXYGEN SATURATION: 95 %

## 2019-10-13 PROCEDURE — 76376 3D RENDER W/INTRP POSTPROCES: CPT | Mod: 26

## 2019-10-13 PROCEDURE — 72192 CT PELVIS W/O DYE: CPT | Mod: 26

## 2019-10-13 PROCEDURE — 99284 EMERGENCY DEPT VISIT MOD MDM: CPT

## 2019-10-13 RX ORDER — TRAMADOL HYDROCHLORIDE 50 MG/1
50 TABLET ORAL ONCE
Refills: 0 | Status: DISCONTINUED | OUTPATIENT
Start: 2019-10-13 | End: 2019-10-13

## 2019-10-13 RX ORDER — MORPHINE SULFATE 50 MG/1
4 CAPSULE, EXTENDED RELEASE ORAL ONCE
Refills: 0 | Status: DISCONTINUED | OUTPATIENT
Start: 2019-10-13 | End: 2019-10-13

## 2019-10-13 RX ORDER — KETOROLAC TROMETHAMINE 30 MG/ML
15 SYRINGE (ML) INJECTION ONCE
Refills: 0 | Status: DISCONTINUED | OUTPATIENT
Start: 2019-10-13 | End: 2019-10-13

## 2019-10-13 RX ADMIN — MORPHINE SULFATE 4 MILLIGRAM(S): 50 CAPSULE, EXTENDED RELEASE ORAL at 19:00

## 2019-10-13 RX ADMIN — Medication 15 MILLIGRAM(S): at 16:20

## 2019-10-13 RX ADMIN — Medication 15 MILLIGRAM(S): at 19:08

## 2019-10-13 RX ADMIN — TRAMADOL HYDROCHLORIDE 50 MILLIGRAM(S): 50 TABLET ORAL at 17:15

## 2019-10-13 RX ADMIN — TRAMADOL HYDROCHLORIDE 50 MILLIGRAM(S): 50 TABLET ORAL at 19:08

## 2019-10-13 NOTE — ED PROVIDER NOTE - CARE PROVIDER_API CALL
Sean Vallejo)  Orthopedics  [address] [place] [address], [address]  [place] [address]  Phone: [phone]  Follow Up Time:

## 2019-10-13 NOTE — CONSULT NOTE ADULT - SUBJECTIVE AND OBJECTIVE BOX
Orthopedic Surgery Consult Note    HPI: 64yMale with history of NHL (s/p 1 course chemo in August), hx of substance abuse on Methadone, presenting to the [facility] ED with L groin pain s/p mechanical fall backward 1 week ago.  Patient reports he fell directly backward after slipping on the floor. He experienced only mild pain after the fall and was able to weight bear without issue immediately after the incident, but over the past several days has noted increased pain that has made it difficult to ambulate.  He has been ambulating with a cane but reports this is becoming inadequate, requesting crutches.  Patient denies head strike or LOC, denies radiation of the pain, denies numbness/tingling of the affected extremity, denies any pain/injury elsewhere.  Patient is a community ambulator without any assistive devices at baseline, lives at home with wife and is independent in all ADLs/IADLs.      PMH/PSH:  PAST MEDICAL & SURGICAL HISTORY:  Methadone maintenance therapy patient  CLL (chronic lymphocytic leukemia)  No significant past surgical history      Vital Signs Last 24 Hrs  T(C): 36.8 (13 Oct 2019 18:05), Max: 36.8 (13 Oct 2019 18:05)  T(F): 98.3 (13 Oct 2019 18:05), Max: 98.3 (13 Oct 2019 18:05)  HR: 66 (13 Oct 2019 18:05) (66 - 77)  BP: 137/71 (13 Oct 2019 18:05) (137/71 - 143/61)  BP(mean): --  RR: 15 (13 Oct 2019 18:05) (15 - 16)  SpO2: 95% (13 Oct 2019 18:05) (95% - 95%)    Physical exam:  Gen: NAD, resting in bed.   Resp: no increased WOB on RA  Back: no gross deformity, no bony stepoff  non-TTP over bony prominences or paraspinal musculature  LLE: No gross deformity, skin intact, no overlying ecchymosis/erythema/swelling  Mildly TTP over groin, non-TTP elsewhere  Full painless passive ROM of hip/knee/ankle  5/5 strength IP/Quad/TA/GS/EHL/FHL  SILT Vang/Sa/T/DP/SP  palpable pulses DP/PT, WWP distally  Bears weight on the L extremity with mild pain        Imaging:    CT pelvis demonstrating L inferior pubic ramus fracture minimally displaced, no other fractures identified Orthopedic Surgery Consult Note    HPI: 64yMale with history of NHL (s/p 1 course chemo in August), hx of substance abuse on Methadone, presenting to the [facility] ED with L groin pain s/p mechanical fall backward 1 week ago.  Patient reports he fell directly backward after slipping on the floor. He experienced only mild pain after the fall and was able to weight bear without issue immediately after the incident, but over the past several days has noted increased pain that has made it difficult to ambulate.  He has been ambulating with a cane but reports this is becoming inadequate, requesting crutches.  Patient denies head strike or LOC, denies radiation of the pain, denies numbness/tingling of the affected extremity, denies any pain/injury elsewhere.  Patient is a community ambulator without any assistive devices at baseline, lives at home with wife and is independent in all ADLs/IADLs.    PMH/PSH:  PAST MEDICAL & SURGICAL HISTORY:  Methadone maintenance therapy patient  CLL (chronic lymphocytic leukemia)  No significant past surgical history      Vital Signs Last 24 Hrs  T(C): 36.8 (13 Oct 2019 18:05), Max: 36.8 (13 Oct 2019 18:05)  T(F): 98.3 (13 Oct 2019 18:05), Max: 98.3 (13 Oct 2019 18:05)  HR: 66 (13 Oct 2019 18:05) (66 - 77)  BP: 137/71 (13 Oct 2019 18:05) (137/71 - 143/61)  BP(mean): --  RR: 15 (13 Oct 2019 18:05) (15 - 16)  SpO2: 95% (13 Oct 2019 18:05) (95% - 95%)    Physical exam:  Gen: NAD, resting in bed.   Resp: no increased WOB on RA  Back: no gross deformity, no bony stepoff  non-TTP over bony prominences or paraspinal musculature  LLE: No gross deformity, skin intact, no overlying ecchymosis/erythema/swelling  Mildly TTP over groin, non-TTP elsewhere  Full painless passive ROM of hip/knee/ankle  5/5 strength IP/Quad/TA/GS/EHL/FHL  SILT Vang/Sa/T/DP/SP  palpable pulses DP/PT, WWP distally  Bears weight on the L extremity with mild pain        Imaging:    CT pelvis demonstrating L inferior/superior pubic ramus fractures, nondisplaced, no other fractures identified Orthopedic Surgery Consult Note    HPI: 64yMale with history of NHL (s/p 1 course chemo in August), hx of substance abuse on Methadone, presenting to the [facility] ED with L groin pain s/p mechanical fall backward 1 week ago.  Patient reports he fell directly backward after slipping on the floor. He experienced only mild pain after the fall and was able to weight bear without issue immediately after the incident, but over the past several days has noted increased pain that has made it difficult to ambulate.  He has been ambulating with a cane but reports this is becoming inadequate, requesting crutches.  Patient denies head strike or LOC, denies radiation of the pain, denies numbness/tingling of the affected extremity, denies any pain/injury elsewhere.  Patient is a community ambulator without any assistive devices at baseline, lives at home with wife and is independent in all ADLs/IADLs.    PMH/PSH:  PAST MEDICAL & SURGICAL HISTORY:  Methadone maintenance therapy patient  CLL (chronic lymphocytic leukemia)  No significant past surgical history      Vital Signs Last 24 Hrs  T(C): 36.8 (13 Oct 2019 18:05), Max: 36.8 (13 Oct 2019 18:05)  T(F): 98.3 (13 Oct 2019 18:05), Max: 98.3 (13 Oct 2019 18:05)  HR: 66 (13 Oct 2019 18:05) (66 - 77)  BP: 137/71 (13 Oct 2019 18:05) (137/71 - 143/61)  BP(mean): --  RR: 15 (13 Oct 2019 18:05) (15 - 16)  SpO2: 95% (13 Oct 2019 18:05) (95% - 95%)    Physical exam:  Gen: NAD, resting in bed.   Resp: no increased WOB on RA  Back: no gross deformity, no bony stepoff  non-TTP over bony prominences or paraspinal musculature  LLE: No gross deformity, skin intact, no overlying ecchymosis/erythema/swelling  Mildly TTP over groin, non-TTP elsewhere  Full painless passive ROM of hip/knee/ankle  5/5 strength IP/Quad/TA/GS/EHL/FHL  SILT Vang/Sa/T/DP/SP  palpable pulses DP/PT, WWP distally  Bears weight on the L extremity with mild pain        Imaging:    CT pelvis demonstrating L inferior pubic ramus fracture and anterior wall acetabulum fracture, nondisplaced, no other fractures identified

## 2019-10-13 NOTE — CONSULT NOTE ADULT - ASSESSMENT
A/P: 64yMale presenting with L inferior pubic ramus fracture s/p mechanical fall 1 week ago, now with increased pain but able to weight bear on the LLE.      - WBAT with assistive devices as needed for comfort, provide crutches  - encourage ambulation  - pain control  - no acute orthopedic intervention indicated at this time  - follow up with Dr. Vallejo in 1-2 weeks after discharge, call for appointment: [phone] A/P: 64yMale presenting with L inferior/superior pubic ramus fractures s/p mechanical fall 1 week ago, now with increased pain but able to weight bear on the LLE.      - WBAT with assistive devices as needed for comfort, provide crutches  - encourage ambulation  - pain control  - no acute orthopedic intervention indicated at this time  - follow up with Dr. Vallejo in 1-2 weeks after discharge, call for appointment: [phone] A/P: 64yMale presenting with nondisplaced L inferior pubic ramus fracture and anterior wall acetabulum fracture s/p mechanical fall 1 week ago, now with increased pain but able to weight bear on the LLE.      - WBAT with assistive devices as needed for comfort, provide crutches  - encourage ambulation  - pain control  - no acute orthopedic intervention indicated at this time  - follow up with Dr. Vallejo in 1-2 weeks after discharge, call for appointment: [phone]

## 2019-10-13 NOTE — ED PROVIDER NOTE - OBJECTIVE STATEMENT
65yo M w/ pmhx of CLL on Rituxan, hx of substance abuse on Methadone presents to the ED c/o pelvic pain s/p mechanical fall x 7 days ago. Pt states while at home his cat walked underneath him causing him to slip and fall landing directly on his "tailbone." Pt endorses pain in the area of the perineum. Denies hematuria, numbness or paresthesias of extremities, bowel/bladder dysfunction, chest pain, sob, n/v/d, abd pain.

## 2019-10-13 NOTE — ED PROVIDER NOTE - EXTREMITY EXAM
left lower extremity findings/FAROM of hip/knee/ankle, pain w/ weight bearing, ambulatory w/ antalgic gait, no sensory deficits, no overt hip deformity.

## 2019-10-13 NOTE — ED PROVIDER NOTE - CARE PLAN
Principal Discharge DX:	Pelvic fracture  Assessment and plan of treatment:	Follow up with your primary care provider within 48 hours  Follow up with an orthopedic doctor within one week  Take Motrin 600mg every 8 hours as needed for pain, take with food  Return to the emergency department with any worsening or concerning symptoms, swelling, numbness/tingling, inability to bear weight or any other concerns.

## 2019-10-13 NOTE — ED PROVIDER NOTE - PATIENT PORTAL LINK FT
You can access the FollowMyHealth Patient Portal offered by [facility] by registering at the following website: [email or web address]. By joining [facility]’s FollowMyHealth portal, you will also be able to view your health information using other applications (apps) compatible with our system.

## 2019-10-23 ENCOUNTER — OUTPATIENT (OUTPATIENT)
Dept: OUTPATIENT SERVICES | Facility: HOSPITAL | Age: 64
LOS: 1 days | Discharge: HOME | End: 2019-10-23

## 2019-10-23 DIAGNOSIS — C91.10 CHRONIC LYMPHOCYTIC LEUKEMIA OF B-CELL TYPE NOT HAVING ACHIEVED REMISSION: ICD-10-CM

## 2019-11-05 ENCOUNTER — RESULT REVIEW (OUTPATIENT)
Age: 64
End: 2019-11-05

## 2019-11-05 ENCOUNTER — APPOINTMENT (OUTPATIENT)
Dept: HEMATOLOGY ONCOLOGY | Facility: CLINIC | Age: 64
End: 2019-11-05
Payer: MEDICAID

## 2019-11-05 VITALS
HEART RATE: 75 BPM | OXYGEN SATURATION: 97 % | DIASTOLIC BLOOD PRESSURE: 71 MMHG | RESPIRATION RATE: 18 BRPM | SYSTOLIC BLOOD PRESSURE: 143 MMHG | WEIGHT: 163.14 LBS | BODY MASS INDEX: 23.41 KG/M2 | TEMPERATURE: 98.4 F

## 2019-11-05 LAB
BASOPHILS # BLD AUTO: 0.1 K/UL — SIGNIFICANT CHANGE UP (ref 0–0.2)
BASOPHILS NFR BLD AUTO: 1 % — SIGNIFICANT CHANGE UP (ref 0–2)
EOSINOPHIL # BLD AUTO: 0.3 K/UL — SIGNIFICANT CHANGE UP (ref 0–0.5)
EOSINOPHIL NFR BLD AUTO: 6 % — SIGNIFICANT CHANGE UP (ref 0–6)
HCT VFR BLD CALC: 34.3 % — LOW (ref 39–50)
HGB BLD-MCNC: 11.7 G/DL — LOW (ref 13–17)
LYMPHOCYTES # BLD AUTO: 6.7 K/UL — HIGH (ref 1–3.3)
LYMPHOCYTES # BLD AUTO: 64 % — HIGH (ref 13–44)
MCHC RBC-ENTMCNC: 30.2 PG — SIGNIFICANT CHANGE UP (ref 27–34)
MCHC RBC-ENTMCNC: 34.1 G/DL — SIGNIFICANT CHANGE UP (ref 32–36)
MCV RBC AUTO: 88.6 FL — SIGNIFICANT CHANGE UP (ref 80–100)
MONOCYTES # BLD AUTO: 0.3 K/UL — SIGNIFICANT CHANGE UP (ref 0–0.9)
MONOCYTES NFR BLD AUTO: 4 % — SIGNIFICANT CHANGE UP (ref 2–14)
NEUTROPHILS # BLD AUTO: 2.3 K/UL — SIGNIFICANT CHANGE UP (ref 1.8–7.4)
NEUTROPHILS NFR BLD AUTO: 25 % — LOW (ref 43–77)
PLAT MORPH BLD: NORMAL — SIGNIFICANT CHANGE UP
PLATELET # BLD AUTO: 92 K/UL — LOW (ref 150–400)
RBC # BLD: 3.88 M/UL — LOW (ref 4.2–5.8)
RBC # FLD: 14.3 % — SIGNIFICANT CHANGE UP (ref 10.3–14.5)
RBC BLD AUTO: SIGNIFICANT CHANGE UP
WBC # BLD: 9.7 K/UL — SIGNIFICANT CHANGE UP (ref 3.8–10.5)
WBC # FLD AUTO: 9.7 K/UL — SIGNIFICANT CHANGE UP (ref 3.8–10.5)

## 2019-11-05 PROCEDURE — 99214 OFFICE O/P EST MOD 30 MIN: CPT

## 2019-11-05 NOTE — PHYSICAL EXAM
[Fully active, able to carry on all pre-disease performance without restriction] : Status 0 - Fully active, able to carry on all pre-disease performance without restriction [Normal] : affect appropriate [de-identified] : Spleen tip palpable on inspiration.  [de-identified] : no CVAT [de-identified] : skin rash sm pink eructations

## 2019-11-05 NOTE — ASSESSMENT
[Palliative] : Goals of care discussed with patient: Palliative [FreeTextEntry1] : Patient with Waldenstrom's on Rituxan and Imbruvica. \par Rash is likely drug related, improving. CBC continues to improve. No GI c/o\par S/p pelvic fracture. \par He is committed to the program and remains on his Methadone program. Smoking cessation was again discussed, and he is considering trying to stop smoking.\par Plan to continue Imbruvica.\par S/p  Rituxan weekly x4,  due again in November.again - weekly x 4 again\par Hep B core (+) on Viread dailycont to follow Hep B DNA PCR\par \par RV 1 month

## 2019-11-05 NOTE — ADDENDUM
[FreeTextEntry1] : Documented by Jovan Espinoza acting as a scribe for Dr. Anjel Carvajal on 11/05/2019\par \par All medical record entries made by a scribe were at my, Dr. Anjel Carvajal direction and personally dictated by me on 11/05/2019 . I have reviewed the chart and agree that the record accurately reflects my personal performance of the history, physical exam, procedure and imaging.\par

## 2019-11-05 NOTE — HISTORY OF PRESENT ILLNESS
[Treatment Protocol] : Treatment Protocol [de-identified] : Mr. Meyer is a 64 year-old male who was diagnosed with CLL in 2003. He has been followed intermittently and was loss of a f/u with hematology. When seen on 5/20/03 the Wbc was 33.5, and was described as progressive.He was seen at the [facility] by Dr. Schulte. \par He was also seen at [facility] ED on 6/14/19 with severe anemia, Hgb of 6.8.  At that time he reported feeling severe fatigue and weakness for years, the Wbc was 13.54, Hgb 6.8, MCV 94.8, Plt 87K, the Anc was 1.22. The CMP showed no abnormalities. 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 He had dyspnea pretransfusion which has improved. He described weight loss back to 2003 when first diagnosed and has more recently stabilized for the last two years. 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 \par The records are incomplete but has been occasionally followed up by Dr. Schulte a hematologist in [facility].\par He c/o significant fatigue that has been worsening over the years.  [FreeTextEntry1] : Imbruvica and weekly Rituxan x 4  [de-identified] : Waldenstrom's-  continues to be maintained on Imbruvica. Completed weekly Rituxan x 4 due again in November starting on 18th. Today WBC 9.7, HGB 11.7, Plts 92K and ALC 6.7.\par HEP B (+) on Viread \par rash generalized-  purpuric drug eruption  prescription sent  for topical steroid; saw derm. Rash much better now\par Fell and had a pelvic fracture;has had pain and is taking ibuprofen. \par \par

## 2019-11-18 ENCOUNTER — LABORATORY RESULT (OUTPATIENT)
Age: 64
End: 2019-11-18

## 2019-11-18 ENCOUNTER — RESULT REVIEW (OUTPATIENT)
Age: 64
End: 2019-11-18

## 2019-11-18 ENCOUNTER — APPOINTMENT (OUTPATIENT)
Dept: INFUSION THERAPY | Facility: HOSPITAL | Age: 64
End: 2019-11-18

## 2019-11-18 LAB
BASOPHILS # BLD AUTO: 0.1 K/UL — SIGNIFICANT CHANGE UP (ref 0–0.2)
BASOPHILS NFR BLD AUTO: 1 % — SIGNIFICANT CHANGE UP (ref 0–2)
EOSINOPHIL # BLD AUTO: 0.5 K/UL — SIGNIFICANT CHANGE UP (ref 0–0.5)
EOSINOPHIL NFR BLD AUTO: 4 % — SIGNIFICANT CHANGE UP (ref 0–6)
HCT VFR BLD CALC: 34.9 % — LOW (ref 39–50)
HGB BLD-MCNC: 11.4 G/DL — LOW (ref 13–17)
LYMPHOCYTES # BLD AUTO: 6.4 K/UL — HIGH (ref 1–3.3)
LYMPHOCYTES # BLD AUTO: 67 % — HIGH (ref 13–44)
MCHC RBC-ENTMCNC: 28.4 PG — SIGNIFICANT CHANGE UP (ref 27–34)
MCHC RBC-ENTMCNC: 32.7 G/DL — SIGNIFICANT CHANGE UP (ref 32–36)
MCV RBC AUTO: 87.1 FL — SIGNIFICANT CHANGE UP (ref 80–100)
MONOCYTES # BLD AUTO: 0.4 K/UL — SIGNIFICANT CHANGE UP (ref 0–0.9)
MONOCYTES NFR BLD AUTO: 5 % — SIGNIFICANT CHANGE UP (ref 2–14)
NEUTROPHILS # BLD AUTO: 2.4 K/UL — SIGNIFICANT CHANGE UP (ref 1.8–7.4)
NEUTROPHILS NFR BLD AUTO: 23 % — LOW (ref 43–77)
PLAT MORPH BLD: NORMAL — SIGNIFICANT CHANGE UP
PLATELET # BLD AUTO: 96 K/UL — LOW (ref 150–400)
RBC # BLD: 4.01 M/UL — LOW (ref 4.2–5.8)
RBC # FLD: 14.5 % — SIGNIFICANT CHANGE UP (ref 10.3–14.5)
RBC BLD AUTO: SIGNIFICANT CHANGE UP
WBC # BLD: 9.7 K/UL — SIGNIFICANT CHANGE UP (ref 3.8–10.5)
WBC # FLD AUTO: 9.7 K/UL — SIGNIFICANT CHANGE UP (ref 3.8–10.5)

## 2019-11-19 ENCOUNTER — OUTPATIENT (OUTPATIENT)
Dept: OUTPATIENT SERVICES | Facility: HOSPITAL | Age: 64
LOS: 1 days | Discharge: HOME | End: 2019-11-19

## 2019-11-19 DIAGNOSIS — R11.2 NAUSEA WITH VOMITING, UNSPECIFIED: ICD-10-CM

## 2019-11-19 DIAGNOSIS — Z51.11 ENCOUNTER FOR ANTINEOPLASTIC CHEMOTHERAPY: ICD-10-CM

## 2019-11-19 DIAGNOSIS — C91.10 CHRONIC LYMPHOCYTIC LEUKEMIA OF B-CELL TYPE NOT HAVING ACHIEVED REMISSION: ICD-10-CM

## 2019-11-25 ENCOUNTER — RESULT REVIEW (OUTPATIENT)
Age: 64
End: 2019-11-25

## 2019-11-25 ENCOUNTER — LABORATORY RESULT (OUTPATIENT)
Age: 64
End: 2019-11-25

## 2019-11-25 ENCOUNTER — APPOINTMENT (OUTPATIENT)
Dept: INFUSION THERAPY | Facility: HOSPITAL | Age: 64
End: 2019-11-25

## 2019-11-25 LAB
BASOPHILS # BLD AUTO: 0.1 K/UL — SIGNIFICANT CHANGE UP (ref 0–0.2)
BASOPHILS NFR BLD AUTO: 1.6 % — SIGNIFICANT CHANGE UP (ref 0–2)
EOSINOPHIL # BLD AUTO: 0.4 K/UL — SIGNIFICANT CHANGE UP (ref 0–0.5)
EOSINOPHIL NFR BLD AUTO: 7.4 % — HIGH (ref 0–6)
HCT VFR BLD CALC: 33.7 % — LOW (ref 39–50)
HGB BLD-MCNC: 11.2 G/DL — LOW (ref 13–17)
LYMPHOCYTES # BLD AUTO: 2.4 K/UL — SIGNIFICANT CHANGE UP (ref 1–3.3)
LYMPHOCYTES # BLD AUTO: 49.1 % — HIGH (ref 13–44)
MCHC RBC-ENTMCNC: 28.8 PG — SIGNIFICANT CHANGE UP (ref 27–34)
MCHC RBC-ENTMCNC: 33.1 G/DL — SIGNIFICANT CHANGE UP (ref 32–36)
MCV RBC AUTO: 86.8 FL — SIGNIFICANT CHANGE UP (ref 80–100)
MONOCYTES # BLD AUTO: 0.4 K/UL — SIGNIFICANT CHANGE UP (ref 0–0.9)
MONOCYTES NFR BLD AUTO: 8 % — SIGNIFICANT CHANGE UP (ref 2–14)
NEUTROPHILS # BLD AUTO: 1.6 K/UL — LOW (ref 1.8–7.4)
NEUTROPHILS NFR BLD AUTO: 33.9 % — LOW (ref 43–77)
PLATELET # BLD AUTO: 81 K/UL — LOW (ref 150–400)
RBC # BLD: 3.88 M/UL — LOW (ref 4.2–5.8)
RBC # FLD: 14.2 % — SIGNIFICANT CHANGE UP (ref 10.3–14.5)
WBC # BLD: 4.8 K/UL — SIGNIFICANT CHANGE UP (ref 3.8–10.5)
WBC # FLD AUTO: 4.8 K/UL — SIGNIFICANT CHANGE UP (ref 3.8–10.5)

## 2019-11-26 DIAGNOSIS — Z51.11 ENCOUNTER FOR ANTINEOPLASTIC CHEMOTHERAPY: ICD-10-CM

## 2019-11-26 DIAGNOSIS — R11.2 NAUSEA WITH VOMITING, UNSPECIFIED: ICD-10-CM

## 2019-12-02 ENCOUNTER — LABORATORY RESULT (OUTPATIENT)
Age: 64
End: 2019-12-02

## 2019-12-02 ENCOUNTER — RESULT REVIEW (OUTPATIENT)
Age: 64
End: 2019-12-02

## 2019-12-02 ENCOUNTER — APPOINTMENT (OUTPATIENT)
Dept: INFUSION THERAPY | Facility: HOSPITAL | Age: 64
End: 2019-12-02

## 2019-12-02 LAB
BASOPHILS # BLD AUTO: 0.1 K/UL — SIGNIFICANT CHANGE UP (ref 0–0.2)
BASOPHILS NFR BLD AUTO: 1.3 % — SIGNIFICANT CHANGE UP (ref 0–2)
EOSINOPHIL # BLD AUTO: 0.4 K/UL — SIGNIFICANT CHANGE UP (ref 0–0.5)
EOSINOPHIL NFR BLD AUTO: 10 % — HIGH (ref 0–6)
HCT VFR BLD CALC: 34.2 % — LOW (ref 39–50)
HGB BLD-MCNC: 10.9 G/DL — LOW (ref 13–17)
LYMPHOCYTES # BLD AUTO: 2.2 K/UL — SIGNIFICANT CHANGE UP (ref 1–3.3)
LYMPHOCYTES # BLD AUTO: 54.3 % — HIGH (ref 13–44)
MCHC RBC-ENTMCNC: 27.8 PG — SIGNIFICANT CHANGE UP (ref 27–34)
MCHC RBC-ENTMCNC: 32 G/DL — SIGNIFICANT CHANGE UP (ref 32–36)
MCV RBC AUTO: 86.9 FL — SIGNIFICANT CHANGE UP (ref 80–100)
MONOCYTES # BLD AUTO: 0.3 K/UL — SIGNIFICANT CHANGE UP (ref 0–0.9)
MONOCYTES NFR BLD AUTO: 8 % — SIGNIFICANT CHANGE UP (ref 2–14)
NEUTROPHILS # BLD AUTO: 1.1 K/UL — LOW (ref 1.8–7.4)
NEUTROPHILS NFR BLD AUTO: 26.3 % — LOW (ref 43–77)
PLATELET # BLD AUTO: 107 K/UL — LOW (ref 150–400)
RBC # BLD: 3.94 M/UL — LOW (ref 4.2–5.8)
RBC # FLD: 14.2 % — SIGNIFICANT CHANGE UP (ref 10.3–14.5)
WBC # BLD: 4.1 K/UL — SIGNIFICANT CHANGE UP (ref 3.8–10.5)
WBC # FLD AUTO: 4.1 K/UL — SIGNIFICANT CHANGE UP (ref 3.8–10.5)

## 2019-12-06 ENCOUNTER — RX RENEWAL (OUTPATIENT)
Age: 64
End: 2019-12-06

## 2019-12-09 ENCOUNTER — LABORATORY RESULT (OUTPATIENT)
Age: 64
End: 2019-12-09

## 2019-12-09 ENCOUNTER — APPOINTMENT (OUTPATIENT)
Dept: INFUSION THERAPY | Facility: HOSPITAL | Age: 64
End: 2019-12-09

## 2019-12-09 ENCOUNTER — RESULT REVIEW (OUTPATIENT)
Age: 64
End: 2019-12-09

## 2019-12-09 LAB
DACRYOCYTES BLD QL SMEAR: SLIGHT — SIGNIFICANT CHANGE UP
ELLIPTOCYTES BLD QL SMEAR: SLIGHT — SIGNIFICANT CHANGE UP
EOSINOPHIL NFR BLD AUTO: 1 % — SIGNIFICANT CHANGE UP (ref 0–6)
HCT VFR BLD CALC: 40.3 % — SIGNIFICANT CHANGE UP (ref 39–50)
HGB BLD-MCNC: 12.9 G/DL — LOW (ref 13–17)
LYMPHOCYTES # BLD AUTO: 1.9 K/UL — SIGNIFICANT CHANGE UP (ref 1–3.3)
LYMPHOCYTES # BLD AUTO: 43 % — SIGNIFICANT CHANGE UP (ref 13–44)
MCHC RBC-ENTMCNC: 27.6 PG — SIGNIFICANT CHANGE UP (ref 27–34)
MCHC RBC-ENTMCNC: 32 G/DL — SIGNIFICANT CHANGE UP (ref 32–36)
MCV RBC AUTO: 86.5 FL — SIGNIFICANT CHANGE UP (ref 80–100)
MONOCYTES # BLD AUTO: 0.4 K/UL — SIGNIFICANT CHANGE UP (ref 0–0.9)
MONOCYTES NFR BLD AUTO: 14 % — SIGNIFICANT CHANGE UP (ref 2–14)
NEUTROPHILS # BLD AUTO: 1.4 K/UL — LOW (ref 1.8–7.4)
NEUTROPHILS NFR BLD AUTO: 42 % — LOW (ref 43–77)
OVALOCYTES BLD QL SMEAR: SLIGHT — SIGNIFICANT CHANGE UP
PLAT MORPH BLD: NORMAL — SIGNIFICANT CHANGE UP
PLATELET # BLD AUTO: 111 K/UL — LOW (ref 150–400)
POIKILOCYTOSIS BLD QL AUTO: SLIGHT — SIGNIFICANT CHANGE UP
RBC # BLD: 4.66 M/UL — SIGNIFICANT CHANGE UP (ref 4.2–5.8)
RBC # FLD: 14.1 % — SIGNIFICANT CHANGE UP (ref 10.3–14.5)
RBC BLD AUTO: ABNORMAL
SCHISTOCYTES BLD QL AUTO: SLIGHT — SIGNIFICANT CHANGE UP
WBC # BLD: 3.7 K/UL — LOW (ref 3.8–10.5)
WBC # FLD AUTO: 3.7 K/UL — LOW (ref 3.8–10.5)

## 2019-12-10 DIAGNOSIS — C85.80 OTHER SPECIFIED TYPES OF NON-HODGKIN LYMPHOMA, UNSPECIFIED SITE: ICD-10-CM

## 2019-12-19 ENCOUNTER — APPOINTMENT (OUTPATIENT)
Dept: INTERNAL MEDICINE | Facility: CLINIC | Age: 64
End: 2019-12-19
Payer: MEDICAID

## 2019-12-19 ENCOUNTER — LABORATORY RESULT (OUTPATIENT)
Age: 64
End: 2019-12-19

## 2019-12-19 VITALS
TEMPERATURE: 98.7 F | OXYGEN SATURATION: 98 % | SYSTOLIC BLOOD PRESSURE: 128 MMHG | HEIGHT: 70 IN | DIASTOLIC BLOOD PRESSURE: 80 MMHG | BODY MASS INDEX: 23.19 KG/M2 | WEIGHT: 162 LBS | HEART RATE: 98 BPM

## 2019-12-19 DIAGNOSIS — Z71.6 TOBACCO ABUSE COUNSELING: ICD-10-CM

## 2019-12-19 DIAGNOSIS — E04.1 NONTOXIC SINGLE THYROID NODULE: ICD-10-CM

## 2019-12-19 DIAGNOSIS — E87.5 HYPERKALEMIA: ICD-10-CM

## 2019-12-19 DIAGNOSIS — Z12.11 ENCOUNTER FOR SCREENING FOR MALIGNANT NEOPLASM OF COLON: ICD-10-CM

## 2019-12-19 LAB
BUN SERPL-MCNC: 16
CA-I SERPL-SCNC: 1.19
CHLORIDE SERPL-SCNC: 102
CO2 BLDA-SCNC: 30
CREAT SERPL-MCNC: 0.8
GLUCOSE SERPL-MCNC: 113
POTASSIUM SERPL-SCNC: 4.2
SODIUM SERPL-SCNC: 141

## 2019-12-19 PROCEDURE — 99214 OFFICE O/P EST MOD 30 MIN: CPT | Mod: 25

## 2019-12-19 PROCEDURE — 80047 BASIC METABLC PNL IONIZED CA: CPT | Mod: QW

## 2019-12-19 PROCEDURE — 36415 COLL VENOUS BLD VENIPUNCTURE: CPT

## 2019-12-19 PROCEDURE — 99406 BEHAV CHNG SMOKING 3-10 MIN: CPT | Mod: 25

## 2019-12-19 NOTE — HISTORY OF PRESENT ILLNESS
[FreeTextEntry1] : smoker [de-identified] : smoker- less than 1ppd. pt has patches\par hyperkalemia- no labs done by heme.  no CP or palpitation\par CLL- pt being f/u Heme-onc\par pt had fall in 10/19- pt went to [facility] ER- CT - showed L pubic rami fx.  minimally displaced fx of hemisacrum- pt never f/u w Ortho.\par pt needs note for work.  no back or hip or pelvic pain now\par thyroid nodule- rpt in 8/2020\par no abd pain, constipation, chg in bm, rectal bleeding\par pt f/u w methadone clinic

## 2019-12-19 NOTE — PLAN
[FreeTextEntry1] : pt will do cologuard test.  refused colonoscopy\par pt refused CT lung for lung ca screening\par pt refused all vaccines\par istat-nl K= 4.2\par pt refused wellbutrin and other aids to quit smoking.  pt wants to try the patch he has at home

## 2019-12-19 NOTE — COUNSELING
[Risk of tobacco use and health benefits of smoking cessation discussed] : Risk of tobacco use and health benefits of smoking cessation discussed [Cessation strategies including cessation program discussed] : Cessation strategies including cessation program discussed [Use of nicotine replacement therapies and other medications discussed] : Use of nicotine replacement therapies and other medications discussed [Encouraged to pick a quit date and identify support needed to quit] : Encouraged to pick a quit date and identify support needed to quit [Willing to Quit Smoking] : Willing to quit smoking [Tobacco Use Cessation Intermediate Greater Than 3 Minutes Up to 10 Minutes] : Tobacco Use Cessation Intermediate Greater Than 3 Minutes Up to 10 Minutes

## 2020-01-02 ENCOUNTER — APPOINTMENT (OUTPATIENT)
Dept: ORTHOPEDIC SURGERY | Facility: CLINIC | Age: 65
End: 2020-01-02
Payer: MEDICAID

## 2020-01-02 VITALS
HEART RATE: 76 BPM | SYSTOLIC BLOOD PRESSURE: 173 MMHG | DIASTOLIC BLOOD PRESSURE: 79 MMHG | WEIGHT: 162 LBS | HEIGHT: 70 IN | BODY MASS INDEX: 23.19 KG/M2

## 2020-01-02 DIAGNOSIS — S32.592D OTHER SPECIFIED FRACTURE OF LEFT PUBIS, SUBSEQUENT ENCOUNTER FOR FRACTURE WITH ROUTINE HEALING: ICD-10-CM

## 2020-01-02 PROCEDURE — 99203 OFFICE O/P NEW LOW 30 MIN: CPT | Mod: 57

## 2020-01-02 PROCEDURE — 72170 X-RAY EXAM OF PELVIS: CPT

## 2020-01-02 PROCEDURE — 27197 CLSD TX PELVIC RING FX: CPT

## 2020-01-07 ENCOUNTER — OUTPATIENT (OUTPATIENT)
Dept: OUTPATIENT SERVICES | Facility: HOSPITAL | Age: 65
LOS: 1 days | Discharge: HOME | End: 2020-01-07

## 2020-01-07 DIAGNOSIS — C91.10 CHRONIC LYMPHOCYTIC LEUKEMIA OF B-CELL TYPE NOT HAVING ACHIEVED REMISSION: ICD-10-CM

## 2020-01-07 PROBLEM — S32.592D CLOSED FRACTURE OF RAMUS OF LEFT PUBIS WITH ROUTINE HEALING, SUBSEQUENT ENCOUNTER: Status: ACTIVE | Noted: 2020-01-07

## 2020-01-07 NOTE — DISCUSSION/SUMMARY
[de-identified] : 64 year old male with subacute left displaced pubic rami fx \par \par I reviewed x-ray films with them, which reveal routine healing of the left pubic rami fractures at this time. Clinically, patient is doing well on examination today and we therefore may proceed with conservative care and return to work activities. A discussion was had with the patient regarding basic fracture care. Bony union typically requires 4-6 wks of relative rest, and symptoms of pain/swelling typically resolve during this time. \par \par Recommendation; May utilize ice (20mins at a time 2-3x daily), OTC pain medication (Tylenol/NSAID's as able), activity/weight bearing totolerance. \par  \par Follow up as needed.\par \par **Cleared for full work activity, no restrictions**

## 2020-01-07 NOTE — PHYSICAL EXAM
[de-identified] : Oriented to time, place, person\par Mood: Normal\par Affect: Normal\par Appearance: Healthy, well appearing, no acute distress.\par Gait: Normal\par Assistive Devices: None\par \par Left Hip Exam:\par \par Skin: Clean/dry and intact\par Inspection: No obvious deformity, no swelling.\par Pulses: 2+ DP/PT pulses\par ROM: 120 degrees of flexion. Internal rotation to 25 degrees, External rotation to 60 degrees.\par Painful ROM: None, No groin pain. No pain on logroll/axial compression. \par Tenderness: No tenderness over greater trochanter. No tenderness at gluteal insertion. No paraspinal tenderness. No axial lumbar tenderness.No sacroiliac tenderness. No ttp over the ASIS/[place] crest.\par Strength: 5/5 hip flexion, 5/5 Gluteal strength,5/5 hip ADD, 5/5 hip ABD, 5/5 Q/H, 5/5 TA/GS/EHL\par Neuro: Sensation in tact to light touch throughout, DTR normal\par Additional tests: Negative impingement test, Negative [place]  [de-identified] : The following radiographs were ordered and read by me during this patients visit. I reviewed each radiograph in detail with the patient and discussed the findings as highlighted below.\par  \par AP pelvis xray were obtained today, 01/02/2020, that show a healing nondisplaced pelvic ring fracture and superior inferior pubic rami fracture. There is no hip degenerative change seen. There is no gross pelvic of hip malalignment. No significant other obvious osseous abnormality, otherwise unremarkable. \par \par CT scan from [facility] dated 10/13/2019 revealed a nondisplaced left superior and inferior pubic rami fracture. There is no underlying degenerative arthritic change seen. Overall alignment is maintained. Otherwise unremarkable.

## 2020-01-07 NOTE — ADDENDUM
[FreeTextEntry1] : This note was written by Paloma Rodriguez on 01/02/2020 acting solely as a scribe for Dr. Reinaldo Small.\par \par All medical record entries made by the Scribe were at my, Dr. Reinaldo Small, direction and personally dictated by me on 01/02/2020. I have personally reviewed the chart and agree that the record accurately reflects my personal performance of the history, physical exam, assessment and plan."

## 2020-01-07 NOTE — HISTORY OF PRESENT ILLNESS
[de-identified] : 64 year old male presents today for evaluation of pelvic injury. He slipped and fell in his kitchen ~12 weeks go. He was seen [facility] ER one week later, and was found to have displaced pubic rami fractures on XR/CT. He is here for f/u, and reports mild pain with heavy lifting. He is here for clearance back to driving as he needs a physician evaluation before he is able to return to full activity. Patient denies any pain on palpation, and is ambulating well without any discomfort or pain to the left hip.\par \par The patient's past medical history, past surgical history, medications and allergies were reviewed by me today with the patient and documented accordingly. In addition, the patient's family and social history, which were noncontributory to this visit, were reviewed also.

## 2020-01-09 ENCOUNTER — RESULT REVIEW (OUTPATIENT)
Age: 65
End: 2020-01-09

## 2020-01-09 ENCOUNTER — APPOINTMENT (OUTPATIENT)
Dept: HEMATOLOGY ONCOLOGY | Facility: CLINIC | Age: 65
End: 2020-01-09
Payer: MEDICAID

## 2020-01-09 VITALS
SYSTOLIC BLOOD PRESSURE: 124 MMHG | BODY MASS INDEX: 23.41 KG/M2 | OXYGEN SATURATION: 99 % | WEIGHT: 163.14 LBS | RESPIRATION RATE: 16 BRPM | DIASTOLIC BLOOD PRESSURE: 67 MMHG | HEART RATE: 61 BPM | TEMPERATURE: 97.9 F

## 2020-01-09 DIAGNOSIS — D61.9 APLASTIC ANEMIA, UNSPECIFIED: ICD-10-CM

## 2020-01-09 LAB
BASOPHILS # BLD AUTO: 0.1 K/UL — SIGNIFICANT CHANGE UP (ref 0–0.2)
BASOPHILS NFR BLD AUTO: 0.9 % — SIGNIFICANT CHANGE UP (ref 0–2)
EOSINOPHIL # BLD AUTO: 0.1 K/UL — SIGNIFICANT CHANGE UP (ref 0–0.5)
EOSINOPHIL NFR BLD AUTO: 2.2 % — SIGNIFICANT CHANGE UP (ref 0–6)
HCT VFR BLD CALC: 43.6 % — SIGNIFICANT CHANGE UP (ref 39–50)
HGB BLD-MCNC: 14.1 G/DL — SIGNIFICANT CHANGE UP (ref 13–17)
LYMPHOCYTES # BLD AUTO: 2.7 K/UL — SIGNIFICANT CHANGE UP (ref 1–3.3)
LYMPHOCYTES # BLD AUTO: 40 % — SIGNIFICANT CHANGE UP (ref 13–44)
MCHC RBC-ENTMCNC: 27.9 PG — SIGNIFICANT CHANGE UP (ref 27–34)
MCHC RBC-ENTMCNC: 32.2 G/DL — SIGNIFICANT CHANGE UP (ref 32–36)
MCV RBC AUTO: 86.6 FL — SIGNIFICANT CHANGE UP (ref 80–100)
MONOCYTES # BLD AUTO: 0.4 K/UL — SIGNIFICANT CHANGE UP (ref 0–0.9)
MONOCYTES NFR BLD AUTO: 5.6 % — SIGNIFICANT CHANGE UP (ref 2–14)
NEUTROPHILS # BLD AUTO: 3.4 K/UL — SIGNIFICANT CHANGE UP (ref 1.8–7.4)
NEUTROPHILS NFR BLD AUTO: 51.3 % — SIGNIFICANT CHANGE UP (ref 43–77)
PLATELET # BLD AUTO: 108 K/UL — LOW (ref 150–400)
RBC # BLD: 5.04 M/UL — SIGNIFICANT CHANGE UP (ref 4.2–5.8)
RBC # FLD: 13.7 % — SIGNIFICANT CHANGE UP (ref 10.3–14.5)
WBC # BLD: 6.7 K/UL — SIGNIFICANT CHANGE UP (ref 3.8–10.5)
WBC # FLD AUTO: 6.7 K/UL — SIGNIFICANT CHANGE UP (ref 3.8–10.5)

## 2020-01-09 PROCEDURE — 99214 OFFICE O/P EST MOD 30 MIN: CPT

## 2020-01-09 RX ORDER — TENOFOVIR DISOPROXIL FUMARATE 300 MG/1
300 TABLET ORAL DAILY
Qty: 30 | Refills: 5 | Status: COMPLETED | COMMUNITY
Start: 2019-07-19 | End: 2020-01-09

## 2020-01-09 RX ORDER — IBRUTINIB 420 MG/1
420 TABLET, FILM COATED ORAL
Qty: 30 | Refills: 4 | Status: COMPLETED | COMMUNITY
Start: 2019-07-18 | End: 2020-01-09

## 2020-01-10 ENCOUNTER — LABORATORY RESULT (OUTPATIENT)
Age: 65
End: 2020-01-10

## 2020-01-10 LAB
ALBUMIN MFR SERPL ELPH: 57.4 %
ALBUMIN SERPL ELPH-MCNC: 4.2 G/DL
ALBUMIN SERPL ELPH-MCNC: 4.4 G/DL
ALBUMIN SERPL-MCNC: 3.8 G/DL
ALBUMIN/GLOB SERPL: 1.3 RATIO
ALP BLD-CCNC: 161 U/L
ALP BLD-CCNC: 177 U/L
ALPHA1 GLOB MFR SERPL ELPH: 5.6 %
ALPHA1 GLOB SERPL ELPH-MCNC: 0.4 G/DL
ALPHA2 GLOB MFR SERPL ELPH: 10.5 %
ALPHA2 GLOB SERPL ELPH-MCNC: 0.7 G/DL
ALT SERPL-CCNC: 11 U/L
ALT SERPL-CCNC: 8 U/L
ANION GAP SERPL CALC-SCNC: 14 MMOL/L
ANION GAP SERPL CALC-SCNC: 15 MMOL/L
AST SERPL-CCNC: 14 U/L
AST SERPL-CCNC: 17 U/L
B-GLOBULIN MFR SERPL ELPH: 15.2 %
B-GLOBULIN SERPL ELPH-MCNC: 1 G/DL
B2 MICROGLOB SERPL-MCNC: 2.8 MG/L
B2 MICROGLOB SERPL-MCNC: 3.9 MG/L
BILIRUB SERPL-MCNC: 0.4 MG/DL
BILIRUB SERPL-MCNC: 0.4 MG/DL
BUN SERPL-MCNC: 14 MG/DL
BUN SERPL-MCNC: 16 MG/DL
CALCIUM SERPL-MCNC: 9 MG/DL
CALCIUM SERPL-MCNC: 9.1 MG/DL
CHLORIDE SERPL-SCNC: 100 MMOL/L
CHLORIDE SERPL-SCNC: 102 MMOL/L
CO2 SERPL-SCNC: 24 MMOL/L
CO2 SERPL-SCNC: 26 MMOL/L
CREAT SERPL-MCNC: 0.87 MG/DL
CREAT SERPL-MCNC: 0.96 MG/DL
DEPRECATED KAPPA LC FREE/LAMBDA SER: 6.33 RATIO
DEPRECATED KAPPA LC FREE/LAMBDA SER: 6.47 RATIO
DEPRECATED KAPPA LC FREE/LAMBDA SER: 6.47 RATIO
GAMMA GLOB FLD ELPH-MCNC: 0.8 G/DL
GAMMA GLOB MFR SERPL ELPH: 11.3 %
GLUCOSE SERPL-MCNC: 86 MG/DL
GLUCOSE SERPL-MCNC: 91 MG/DL
IGA SER QL IEP: 73 MG/DL
IGA SER QL IEP: 78 MG/DL
IGG SER QL IEP: 814 MG/DL
IGG SER QL IEP: 940 MG/DL
IGM SER QL IEP: 423 MG/DL
IGM SER QL IEP: 499 MG/DL
INTERPRETATION SERPL IEP-IMP: NORMAL
KAPPA LC CSF-MCNC: 1.03 MG/DL
KAPPA LC CSF-MCNC: 1.03 MG/DL
KAPPA LC CSF-MCNC: 1.35 MG/DL
KAPPA LC SERPL-MCNC: 6.66 MG/DL
KAPPA LC SERPL-MCNC: 6.66 MG/DL
KAPPA LC SERPL-MCNC: 8.55 MG/DL
LDH SERPL-CCNC: 173 U/L
LDH SERPL-CCNC: 183 U/L
M PROTEIN MFR SERPL ELPH: 4.2 %
M PROTEIN SPEC IFE-MCNC: NORMAL
MONOCLON BAND OBS SERPL: 0.3 G/DL
POTASSIUM SERPL-SCNC: 4.6 MMOL/L
POTASSIUM SERPL-SCNC: 4.7 MMOL/L
PROT SERPL-MCNC: 6.6 G/DL
PROT SERPL-MCNC: 6.7 G/DL
SODIUM SERPL-SCNC: 139 MMOL/L
SODIUM SERPL-SCNC: 141 MMOL/L

## 2020-01-10 NOTE — HISTORY OF PRESENT ILLNESS
[Treatment Protocol] : Treatment Protocol [de-identified] : Mr. Meyer is a 63 year-old male who was diagnosed with CLL in 2003. He has been followed intermittently and was loss of a f/u with hematology. When seen on 5/20/03 the Wbc was 33.5, and was described as progressive.He was seen at the [facility] by Dr. Schulte. \par He was also seen at [facility] ED on 6/14/19 with severe anemia, Hgb of 6.8.  At that time he reported feeling severe fatigue and weakness for years, the Wbc was 13.54, Hgb 6.8, MCV 94.8, Plt 87K, the Anc was 1.22. The CMP showed no abnormalities. 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 He had dyspnea pretransfusion which has improved. He described weight loss back to 2003 when first diagnosed and has more recently stabilized for the last two years. 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 \par The records are incomplete but has been occasionally followed up by Dr. Schulte a hematologist in [facility].\par He c/o significant fatigue that has been worsening over the years.  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

## 2020-01-10 NOTE — ASSESSMENT
[FreeTextEntry1] : patient with Waldenstrom Lymphoma to s/p treatment with Rituxan continues on Imbruvica\par He is committed to the program and remains on his Methadone program. Smoking cessation  was discussed, and he is considering trying to stop smoking\par plan: continue Imbruvica \par HEP B core positive on Viread daily. follow Hep B by PCR \par f/u 2 month [Palliative] : Goals of care discussed with patient: Palliative

## 2020-03-02 ENCOUNTER — OUTPATIENT (OUTPATIENT)
Dept: OUTPATIENT SERVICES | Facility: HOSPITAL | Age: 65
LOS: 1 days | Discharge: HOME | End: 2020-03-02

## 2020-03-02 DIAGNOSIS — C91.10 CHRONIC LYMPHOCYTIC LEUKEMIA OF B-CELL TYPE NOT HAVING ACHIEVED REMISSION: ICD-10-CM

## 2020-03-09 ENCOUNTER — APPOINTMENT (OUTPATIENT)
Dept: HEMATOLOGY ONCOLOGY | Facility: CLINIC | Age: 65
End: 2020-03-09
Payer: MEDICAID

## 2020-03-09 ENCOUNTER — RESULT REVIEW (OUTPATIENT)
Age: 65
End: 2020-03-09

## 2020-03-09 VITALS
DIASTOLIC BLOOD PRESSURE: 68 MMHG | WEIGHT: 164.24 LBS | HEART RATE: 67 BPM | BODY MASS INDEX: 23.57 KG/M2 | SYSTOLIC BLOOD PRESSURE: 110 MMHG | RESPIRATION RATE: 17 BRPM | OXYGEN SATURATION: 98 % | TEMPERATURE: 97.4 F

## 2020-03-09 DIAGNOSIS — S32.9XXA FRACTURE OF UNSPECIFIED PARTS OF LUMBOSACRAL SPINE AND PELVIS, INITIAL ENCOUNTER FOR CLOSED FRACTURE: ICD-10-CM

## 2020-03-09 DIAGNOSIS — R76.8 OTHER SPECIFIED ABNORMAL IMMUNOLOGICAL FINDINGS IN SERUM: ICD-10-CM

## 2020-03-09 LAB
BASOPHILS # BLD AUTO: 0.07 K/UL — SIGNIFICANT CHANGE UP (ref 0–0.2)
BASOPHILS NFR BLD AUTO: 1.2 % — SIGNIFICANT CHANGE UP (ref 0–2)
EOSINOPHIL # BLD AUTO: 0.2 K/UL — SIGNIFICANT CHANGE UP (ref 0–0.5)
EOSINOPHIL NFR BLD AUTO: 3.5 % — SIGNIFICANT CHANGE UP (ref 0–6)
HCT VFR BLD CALC: 42.6 % — SIGNIFICANT CHANGE UP (ref 39–50)
HGB BLD-MCNC: 13.6 G/DL — SIGNIFICANT CHANGE UP (ref 13–17)
IMM GRANULOCYTES NFR BLD AUTO: 0.2 % — SIGNIFICANT CHANGE UP (ref 0–1.5)
LYMPHOCYTES # BLD AUTO: 2.42 K/UL — SIGNIFICANT CHANGE UP (ref 1–3.3)
LYMPHOCYTES # BLD AUTO: 42.8 % — SIGNIFICANT CHANGE UP (ref 13–44)
MCHC RBC-ENTMCNC: 27.4 PG — SIGNIFICANT CHANGE UP (ref 27–34)
MCHC RBC-ENTMCNC: 31.9 GM/DL — LOW (ref 32–36)
MCV RBC AUTO: 85.9 FL — SIGNIFICANT CHANGE UP (ref 80–100)
MONOCYTES # BLD AUTO: 0.32 K/UL — SIGNIFICANT CHANGE UP (ref 0–0.9)
MONOCYTES NFR BLD AUTO: 5.7 % — SIGNIFICANT CHANGE UP (ref 2–14)
NEUTROPHILS # BLD AUTO: 2.64 K/UL — SIGNIFICANT CHANGE UP (ref 1.8–7.4)
NEUTROPHILS NFR BLD AUTO: 46.6 % — SIGNIFICANT CHANGE UP (ref 43–77)
NRBC # BLD: 0 /100 WBCS — SIGNIFICANT CHANGE UP (ref 0–0)
PLATELET # BLD AUTO: 115 K/UL — LOW (ref 150–400)
RBC # BLD: 4.96 M/UL — SIGNIFICANT CHANGE UP (ref 4.2–5.8)
RBC # FLD: 14.9 % — HIGH (ref 10.3–14.5)
WBC # BLD: 5.66 K/UL — SIGNIFICANT CHANGE UP (ref 3.8–10.5)
WBC # FLD AUTO: 5.66 K/UL — SIGNIFICANT CHANGE UP (ref 3.8–10.5)

## 2020-03-09 PROCEDURE — 99214 OFFICE O/P EST MOD 30 MIN: CPT

## 2020-03-09 RX ORDER — NICOTINE 21 MG/24HR
21 PATCH, TRANSDERMAL 24 HOURS TRANSDERMAL DAILY
Qty: 30 | Refills: 3 | Status: DISCONTINUED | COMMUNITY
Start: 2019-06-13 | End: 2020-03-09

## 2020-03-09 NOTE — PHYSICAL EXAM
[Fully active, able to carry on all pre-disease performance without restriction] : Status 0 - Fully active, able to carry on all pre-disease performance without restriction [Normal] : affect appropriate [de-identified] : scattered expiratory wheeze  [de-identified] : Spleen tip palpable on inspiration.  [de-identified] : no CVAT [de-identified] : skin rash sm pink eructations

## 2020-03-09 NOTE — HISTORY OF PRESENT ILLNESS
[Treatment Protocol] : Treatment Protocol [de-identified] : Mr. Meyer is a 64 year-old male who was diagnosed with CLL in 2003. He has been followed intermittently and was loss of a f/u with hematology. When seen on 5/20/03 the Wbc was 33.5, and was described as progressive.He was seen at the [facility] by Dr. Schulte. \par He was also seen at [facility] ED on 6/14/19 with severe anemia, Hgb of 6.8.  At that time he reported feeling severe fatigue and weakness for years, the Wbc was 13.54, Hgb 6.8, MCV 94.8, Plt 87K, the Anc was 1.22. The CMP showed no abnormalities. 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 He had dyspnea pretransfusion which has improved. He described weight loss back to 2003 when first diagnosed and has more recently stabilized for the last two years. 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 \par The records are incomplete but has been occasionally followed up by Dr. Schulte a hematologist in [facility].\par He c/o significant fatigue that has been worsening over the years.  [FreeTextEntry1] : Imbruvica and weekly Rituxan x 4  [de-identified] : Waldenstrom's-  Maintained on Imbruvica. Completed weekly Rituxan x 4 on weeks 1-4 and again weeks 17-20 of treatment. Second 4 week Rituxan cycle began 11/18/19.\par HEP B (+) on Viread \par Rash less generalized-  papular eruption mainly on dorsum of hands. Has been treated with topical steroids in the past. \par Recovered from pelvic fracture from about 5 months ago. Pain pretty much resolved. \par No constitutional complaints. \par \par Today CBC shows a WBC 5.7, HGB 13.6, PLT 115k. \par

## 2020-03-09 NOTE — ADDENDUM
[FreeTextEntry1] : Documented by Vicky Thomas acting as a scribe for Dr. Anjel Carvajal on 03/09/2020 \par \par All medical record entries made by a scribe were at my, Dr. Anjel Carvajal direction and personally dictated by me on 03/09/2020  . I have reviewed the chart and agree that the record accurately reflects my personal performance of the history, physical exam, procedure and imaging.\par

## 2020-06-03 ENCOUNTER — OUTPATIENT (OUTPATIENT)
Dept: OUTPATIENT SERVICES | Facility: HOSPITAL | Age: 65
LOS: 1 days | Discharge: HOME | End: 2020-06-03

## 2020-06-03 DIAGNOSIS — C91.10 CHRONIC LYMPHOCYTIC LEUKEMIA OF B-CELL TYPE NOT HAVING ACHIEVED REMISSION: ICD-10-CM

## 2020-06-08 ENCOUNTER — LABORATORY RESULT (OUTPATIENT)
Age: 65
End: 2020-06-08

## 2020-06-08 ENCOUNTER — RESULT REVIEW (OUTPATIENT)
Age: 65
End: 2020-06-08

## 2020-06-08 ENCOUNTER — APPOINTMENT (OUTPATIENT)
Dept: HEMATOLOGY ONCOLOGY | Facility: CLINIC | Age: 65
End: 2020-06-08
Payer: MEDICAID

## 2020-06-08 ENCOUNTER — APPOINTMENT (OUTPATIENT)
Dept: HEMATOLOGY ONCOLOGY | Facility: CLINIC | Age: 65
End: 2020-06-08

## 2020-06-08 LAB
ALBUMIN SERPL ELPH-MCNC: 4.2 G/DL
ALP BLD-CCNC: 163 U/L
ALT SERPL-CCNC: 9 U/L
ANION GAP SERPL CALC-SCNC: 14 MMOL/L
AST SERPL-CCNC: 13 U/L
B2 MICROGLOB SERPL-MCNC: 2.5 MG/L
BASOPHILS # BLD AUTO: 0.05 K/UL — SIGNIFICANT CHANGE UP (ref 0–0.2)
BASOPHILS NFR BLD AUTO: 0.8 % — SIGNIFICANT CHANGE UP (ref 0–2)
BILIRUB SERPL-MCNC: 0.2 MG/DL
BUN SERPL-MCNC: 11 MG/DL
CALCIUM SERPL-MCNC: 8.6 MG/DL
CHLORIDE SERPL-SCNC: 103 MMOL/L
CO2 SERPL-SCNC: 24 MMOL/L
CREAT SERPL-MCNC: 0.83 MG/DL
DEPRECATED KAPPA LC FREE/LAMBDA SER: 4.81 RATIO
DEPRECATED KAPPA LC FREE/LAMBDA SER: 4.81 RATIO
EOSINOPHIL # BLD AUTO: 0.11 K/UL — SIGNIFICANT CHANGE UP (ref 0–0.5)
EOSINOPHIL NFR BLD AUTO: 1.8 % — SIGNIFICANT CHANGE UP (ref 0–6)
GLUCOSE SERPL-MCNC: 94 MG/DL
HCT VFR BLD CALC: 40.6 % — SIGNIFICANT CHANGE UP (ref 39–50)
HGB BLD-MCNC: 13.6 G/DL — SIGNIFICANT CHANGE UP (ref 13–17)
IGA SER QL IEP: 69 MG/DL
IGG SER QL IEP: 861 MG/DL
IGM SER QL IEP: 356 MG/DL
IMM GRANULOCYTES NFR BLD AUTO: 0.3 % — SIGNIFICANT CHANGE UP (ref 0–1.5)
KAPPA LC CSF-MCNC: 1.32 MG/DL
KAPPA LC CSF-MCNC: 1.32 MG/DL
KAPPA LC SERPL-MCNC: 6.35 MG/DL
KAPPA LC SERPL-MCNC: 6.35 MG/DL
LDH SERPL-CCNC: 182 U/L
LYMPHOCYTES # BLD AUTO: 2.01 K/UL — SIGNIFICANT CHANGE UP (ref 1–3.3)
LYMPHOCYTES # BLD AUTO: 33.1 % — SIGNIFICANT CHANGE UP (ref 13–44)
MCHC RBC-ENTMCNC: 29.3 PG — SIGNIFICANT CHANGE UP (ref 27–34)
MCHC RBC-ENTMCNC: 33.5 GM/DL — SIGNIFICANT CHANGE UP (ref 32–36)
MCV RBC AUTO: 87.5 FL — SIGNIFICANT CHANGE UP (ref 80–100)
MONOCYTES # BLD AUTO: 0.36 K/UL — SIGNIFICANT CHANGE UP (ref 0–0.9)
MONOCYTES NFR BLD AUTO: 5.9 % — SIGNIFICANT CHANGE UP (ref 2–14)
NEUTROPHILS # BLD AUTO: 3.52 K/UL — SIGNIFICANT CHANGE UP (ref 1.8–7.4)
NEUTROPHILS NFR BLD AUTO: 58.1 % — SIGNIFICANT CHANGE UP (ref 43–77)
NRBC # BLD: 0 /100 WBCS — SIGNIFICANT CHANGE UP (ref 0–0)
PLATELET # BLD AUTO: 129 K/UL — LOW (ref 150–400)
POTASSIUM SERPL-SCNC: 5 MMOL/L
PROT SERPL-MCNC: 6.2 G/DL
RBC # BLD: 4.64 M/UL — SIGNIFICANT CHANGE UP (ref 4.2–5.8)
RBC # FLD: 14.5 % — SIGNIFICANT CHANGE UP (ref 10.3–14.5)
SODIUM SERPL-SCNC: 140 MMOL/L
WBC # BLD: 6.07 K/UL — SIGNIFICANT CHANGE UP (ref 3.8–10.5)
WBC # FLD AUTO: 6.07 K/UL — SIGNIFICANT CHANGE UP (ref 3.8–10.5)

## 2020-06-08 PROCEDURE — 99214 OFFICE O/P EST MOD 30 MIN: CPT | Mod: 95

## 2020-06-09 LAB
ALBUMIN SERPL ELPH-MCNC: 4.5 G/DL
ALP BLD-CCNC: 138 U/L
ALT SERPL-CCNC: 9 U/L
ANION GAP SERPL CALC-SCNC: 13 MMOL/L
AST SERPL-CCNC: 17 U/L
B2 MICROGLOB SERPL-MCNC: 2.2 MG/L
BILIRUB SERPL-MCNC: 0.4 MG/DL
BUN SERPL-MCNC: 13 MG/DL
CALCIUM SERPL-MCNC: 8.7 MG/DL
CHLORIDE SERPL-SCNC: 103 MMOL/L
CO2 SERPL-SCNC: 24 MMOL/L
CREAT SERPL-MCNC: 0.75 MG/DL
DEPRECATED KAPPA LC FREE/LAMBDA SER: 5.29 RATIO
DEPRECATED KAPPA LC FREE/LAMBDA SER: 5.29 RATIO
GLUCOSE SERPL-MCNC: 97 MG/DL
IGA SER QL IEP: 64 MG/DL
IGG SER QL IEP: 853 MG/DL
IGM SER QL IEP: 320 MG/DL
KAPPA LC CSF-MCNC: 0.9 MG/DL
KAPPA LC CSF-MCNC: 0.9 MG/DL
KAPPA LC SERPL-MCNC: 4.76 MG/DL
KAPPA LC SERPL-MCNC: 4.76 MG/DL
LDH SERPL-CCNC: 162 U/L
POTASSIUM SERPL-SCNC: 4.2 MMOL/L
PROT SERPL-MCNC: 6.6 G/DL
SODIUM SERPL-SCNC: 140 MMOL/L

## 2020-06-10 LAB
HBV DNA # SERPL NAA+PROBE: NOT DETECTED
HEPB DNA PCR LOG: NOT DETECTED LOG10IU/ML

## 2020-06-14 NOTE — HISTORY OF PRESENT ILLNESS
[Treatment Protocol] : Treatment Protocol [Home] : at home, [unfilled] , at the time of the visit. [Medical Office: ([facility])___] : at the medical office located in  [de-identified] : Mr. Meyer is a 63 year-old male who was diagnosed with CLL in 2003. He has been followed intermittently and was loss of a f/u with hematology. When seen on 5/20/03 the Wbc was 33.5, and was described as progressive.He was seen at the [facility] by Dr. Scuhlte. \par He was also seen at [facility] ED on 6/14/19 with severe anemia, Hgb of 6.8.  At that time he reported feeling severe fatigue and weakness for years, the Wbc was 13.54, Hgb 6.8, MCV 94.8, Plt 87K, the Anc was 1.22. The CMP showed no abnormalities. 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 He had dyspnea pretransfusion which has improved. He described weight loss back to 2003 when first diagnosed and has more recently stabilized for the last two years. 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 \par The records are incomplete but has been occasionally followed up by Dr. Schulte a hematologist in [facility].\par He c/o significant fatigue that has been worsening over the years.  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

## 2020-06-14 NOTE — ASSESSMENT
[Palliative] : Goals of care discussed with patient: Palliative [FreeTextEntry1] : \par Patient with Waldenstrom's s/p 8 doses of Rituxan and now on maintenance Imbruvica. \par Rash is maybe drug related, needs to follow up with DERm \par S/p pelvic fracture. \par PLT count is continuing to improve, Hgb is stabilizing in 13.5-14 range. \par He is committed to the program and remains on his Methadone program. Smoking cessation was again discussed, and he remains open to stopping smoking. \par Continue Imbruvica.\par Hep B core (+) on Viread daily; cont to follow Hep B DNA PCR\par \par RV 3 month. \par

## 2020-08-26 ENCOUNTER — APPOINTMENT (OUTPATIENT)
Dept: DERMATOLOGY | Facility: CLINIC | Age: 65
End: 2020-08-26

## 2020-09-03 RX ORDER — TENOFOVIR DISOPROXIL FUMARATE 300 MG/1
300 TABLET ORAL DAILY
Qty: 90 | Refills: 3 | Status: ACTIVE | COMMUNITY
Start: 2020-09-03 | End: 1900-01-01

## 2020-10-14 ENCOUNTER — APPOINTMENT (OUTPATIENT)
Dept: DERMATOLOGY | Facility: CLINIC | Age: 65
End: 2020-10-14

## 2020-12-09 ENCOUNTER — OUTPATIENT (OUTPATIENT)
Dept: OUTPATIENT SERVICES | Facility: HOSPITAL | Age: 65
LOS: 1 days | Discharge: HOME | End: 2020-12-09

## 2020-12-09 DIAGNOSIS — C91.10 CHRONIC LYMPHOCYTIC LEUKEMIA OF B-CELL TYPE NOT HAVING ACHIEVED REMISSION: ICD-10-CM

## 2020-12-31 ENCOUNTER — RESULT REVIEW (OUTPATIENT)
Age: 65
End: 2020-12-31

## 2020-12-31 ENCOUNTER — APPOINTMENT (OUTPATIENT)
Dept: HEMATOLOGY ONCOLOGY | Facility: CLINIC | Age: 65
End: 2020-12-31
Payer: MEDICAID

## 2020-12-31 VITALS
OXYGEN SATURATION: 96 % | WEIGHT: 175.02 LBS | RESPIRATION RATE: 18 BRPM | HEART RATE: 68 BPM | HEIGHT: 70.87 IN | DIASTOLIC BLOOD PRESSURE: 78 MMHG | BODY MASS INDEX: 24.5 KG/M2 | SYSTOLIC BLOOD PRESSURE: 155 MMHG | TEMPERATURE: 97.9 F

## 2020-12-31 DIAGNOSIS — Z80.0 FAMILY HISTORY OF MALIGNANT NEOPLASM OF DIGESTIVE ORGANS: ICD-10-CM

## 2020-12-31 DIAGNOSIS — R16.1 SPLENOMEGALY, NOT ELSEWHERE CLASSIFIED: ICD-10-CM

## 2020-12-31 DIAGNOSIS — D69.6 THROMBOCYTOPENIA, UNSPECIFIED: ICD-10-CM

## 2020-12-31 LAB
BASOPHILS # BLD AUTO: 0.06 K/UL — SIGNIFICANT CHANGE UP (ref 0–0.2)
BASOPHILS NFR BLD AUTO: 1 % — SIGNIFICANT CHANGE UP (ref 0–2)
EOSINOPHIL # BLD AUTO: 0.25 K/UL — SIGNIFICANT CHANGE UP (ref 0–0.5)
EOSINOPHIL NFR BLD AUTO: 4 % — SIGNIFICANT CHANGE UP (ref 0–6)
HCT VFR BLD CALC: 40.9 % — SIGNIFICANT CHANGE UP (ref 39–50)
HGB BLD-MCNC: 13 G/DL — SIGNIFICANT CHANGE UP (ref 13–17)
IMM GRANULOCYTES NFR BLD AUTO: 0.2 % — SIGNIFICANT CHANGE UP (ref 0–1.5)
LYMPHOCYTES # BLD AUTO: 2.11 K/UL — SIGNIFICANT CHANGE UP (ref 1–3.3)
LYMPHOCYTES # BLD AUTO: 33.8 % — SIGNIFICANT CHANGE UP (ref 13–44)
MCHC RBC-ENTMCNC: 28.8 PG — SIGNIFICANT CHANGE UP (ref 27–34)
MCHC RBC-ENTMCNC: 31.8 G/DL — LOW (ref 32–36)
MCV RBC AUTO: 90.7 FL — SIGNIFICANT CHANGE UP (ref 80–100)
MONOCYTES # BLD AUTO: 0.42 K/UL — SIGNIFICANT CHANGE UP (ref 0–0.9)
MONOCYTES NFR BLD AUTO: 6.7 % — SIGNIFICANT CHANGE UP (ref 2–14)
NEUTROPHILS # BLD AUTO: 3.4 K/UL — SIGNIFICANT CHANGE UP (ref 1.8–7.4)
NEUTROPHILS NFR BLD AUTO: 54.3 % — SIGNIFICANT CHANGE UP (ref 43–77)
NRBC # BLD: 0 /100 WBCS — SIGNIFICANT CHANGE UP (ref 0–0)
PLATELET # BLD AUTO: 121 K/UL — LOW (ref 150–400)
RBC # BLD: 4.51 M/UL — SIGNIFICANT CHANGE UP (ref 4.2–5.8)
RBC # FLD: 13.9 % — SIGNIFICANT CHANGE UP (ref 10.3–14.5)
WBC # BLD: 6.25 K/UL — SIGNIFICANT CHANGE UP (ref 3.8–10.5)
WBC # FLD AUTO: 6.25 K/UL — SIGNIFICANT CHANGE UP (ref 3.8–10.5)

## 2020-12-31 PROCEDURE — 99406 BEHAV CHNG SMOKING 3-10 MIN: CPT

## 2020-12-31 PROCEDURE — 99072 ADDL SUPL MATRL&STAF TM PHE: CPT

## 2020-12-31 PROCEDURE — 99214 OFFICE O/P EST MOD 30 MIN: CPT | Mod: 25

## 2020-12-31 NOTE — ASSESSMENT
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

## 2020-12-31 NOTE — PHYSICAL EXAM
[Fully active, able to carry on all pre-disease performance without restriction] : Status 0 - Fully active, able to carry on all pre-disease performance without restriction [Normal] : affect appropriate [de-identified] : end insp wheezes [de-identified] : nlo CVAT

## 2020-12-31 NOTE — HISTORY OF PRESENT ILLNESS
[Treatment Protocol] : Treatment Protocol [de-identified] : Mr. Meyer is a 63 year-old male who was diagnosed with CLL in 2003. He has been followed intermittently and was loss of a f/u with hematology. When seen on 5/20/03 the Wbc was 33.5, and was described as progressive.He was seen at the [facility] by Dr. Schulte. \par He was also seen at [facility] ED on 6/14/19 with severe anemia, Hgb of 6.8.  At that time he reported feeling severe fatigue and weakness for years, the Wbc was 13.54, Hgb 6.8, MCV 94.8, Plt 87K, the Anc was 1.22. The CMP showed no abnormalities. 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 He had dyspnea pretransfusion which has improved. He described weight loss back to 2003 when first diagnosed and has more recently stabilized for the last two years. 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 \par The records are incomplete but has been occasionally followed up by Dr. Schulte a hematologist in [facility].\par He c/o significant fatigue that has been worsening over the years.  [FreeTextEntry1] : Imbruvica and weekly Rituxan x 4 x 2 rounds last 11/18 [de-identified] : Tiffanie -  maintained on Imbruvica  completed weekly Rituxan x 4 completed second round  starting on 11/18/19 \par (e.g. 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

## 2021-01-05 LAB
HBV DNA # SERPL NAA+PROBE: NOT DETECTED IU/ML
HBV DNA # SERPL NAA+PROBE: NOT DETECTED IU/ML
HEPB DNA PCR LOG: NOT DETECTED LOG10IU/ML
HEPB DNA PCR LOG: NOT DETECTED LOG10IU/ML

## 2021-04-30 RX ORDER — TENOFOVIR DISOPROXIL FUMARATE 300 MG/1
300 TABLET ORAL DAILY
Qty: 30 | Refills: 5 | Status: COMPLETED | COMMUNITY
Start: 2019-07-26 | End: 2021-04-30

## 2021-04-30 RX ORDER — IBRUTINIB 420 MG/1
420 TABLET, FILM COATED ORAL
Qty: 90 | Refills: 3 | Status: COMPLETED | COMMUNITY
Start: 2019-12-06 | End: 2021-04-30

## 2021-04-30 RX ORDER — IBRUTINIB 420 MG/1
420 TABLET, FILM COATED ORAL
Qty: 90 | Refills: 3 | Status: COMPLETED | COMMUNITY
Start: 2020-03-17 | End: 2021-04-30

## 2021-10-11 ENCOUNTER — OUTPATIENT (OUTPATIENT)
Dept: OUTPATIENT SERVICES | Facility: HOSPITAL | Age: 66
LOS: 1 days | Discharge: HOME | End: 2021-10-11

## 2021-10-11 DIAGNOSIS — C91.10 CHRONIC LYMPHOCYTIC LEUKEMIA OF B-CELL TYPE NOT HAVING ACHIEVED REMISSION: ICD-10-CM

## 2021-10-14 ENCOUNTER — RESULT REVIEW (OUTPATIENT)
Age: 66
End: 2021-10-14

## 2021-10-14 ENCOUNTER — APPOINTMENT (OUTPATIENT)
Dept: HEMATOLOGY ONCOLOGY | Facility: CLINIC | Age: 66
End: 2021-10-14
Payer: MEDICAID

## 2021-10-14 VITALS
HEIGHT: 70.87 IN | RESPIRATION RATE: 18 BRPM | OXYGEN SATURATION: 96 % | HEART RATE: 88 BPM | BODY MASS INDEX: 23.98 KG/M2 | SYSTOLIC BLOOD PRESSURE: 116 MMHG | TEMPERATURE: 99 F | DIASTOLIC BLOOD PRESSURE: 71 MMHG | WEIGHT: 171.3 LBS

## 2021-10-14 DIAGNOSIS — Z78.9 OTHER SPECIFIED HEALTH STATUS: ICD-10-CM

## 2021-10-14 LAB
ALBUMIN MFR SERPL ELPH: 61.7 %
ALBUMIN SERPL ELPH-MCNC: 4.2 G/DL
ALBUMIN SERPL-MCNC: 3.8 G/DL
ALBUMIN/GLOB SERPL: 1.6 RATIO
ALP BLD-CCNC: 136 U/L
ALPHA1 GLOB MFR SERPL ELPH: 4.4 %
ALPHA1 GLOB SERPL ELPH-MCNC: 0.3 G/DL
ALPHA2 GLOB MFR SERPL ELPH: 10.1 %
ALPHA2 GLOB SERPL ELPH-MCNC: 0.6 G/DL
ALT SERPL-CCNC: 7 U/L
ANION GAP SERPL CALC-SCNC: 10 MMOL/L
AST SERPL-CCNC: 13 U/L
B-GLOBULIN MFR SERPL ELPH: 12.1 %
B-GLOBULIN SERPL ELPH-MCNC: 0.8 G/DL
BASOPHILS # BLD AUTO: 0.04 K/UL — SIGNIFICANT CHANGE UP (ref 0–0.2)
BASOPHILS NFR BLD AUTO: 0.7 % — SIGNIFICANT CHANGE UP (ref 0–2)
BILIRUB SERPL-MCNC: 0.2 MG/DL
BUN SERPL-MCNC: 10 MG/DL
CALCIUM SERPL-MCNC: 9 MG/DL
CHLORIDE SERPL-SCNC: 102 MMOL/L
CO2 SERPL-SCNC: 27 MMOL/L
CREAT SERPL-MCNC: 0.82 MG/DL
DEPRECATED KAPPA LC FREE/LAMBDA SER: 3.88 RATIO
EOSINOPHIL # BLD AUTO: 0.28 K/UL — SIGNIFICANT CHANGE UP (ref 0–0.5)
EOSINOPHIL NFR BLD AUTO: 5.1 % — SIGNIFICANT CHANGE UP (ref 0–6)
GAMMA GLOB FLD ELPH-MCNC: 0.7 G/DL
GAMMA GLOB MFR SERPL ELPH: 11.7 %
GLUCOSE SERPL-MCNC: 109 MG/DL
HCT VFR BLD CALC: 39.8 % — SIGNIFICANT CHANGE UP (ref 39–50)
HGB BLD-MCNC: 13 G/DL — SIGNIFICANT CHANGE UP (ref 13–17)
IGA SER QL IEP: 62 MG/DL
IGG SER QL IEP: 809 MG/DL
IGM SER QL IEP: 251 MG/DL
IMM GRANULOCYTES NFR BLD AUTO: 0.2 % — SIGNIFICANT CHANGE UP (ref 0–1.5)
INTERPRETATION SERPL IEP-IMP: NORMAL
KAPPA LC CSF-MCNC: 1.18 MG/DL
KAPPA LC SERPL-MCNC: 4.58 MG/DL
LDH SERPL-CCNC: 188 U/L
LYMPHOCYTES # BLD AUTO: 1.65 K/UL — SIGNIFICANT CHANGE UP (ref 1–3.3)
LYMPHOCYTES # BLD AUTO: 30 % — SIGNIFICANT CHANGE UP (ref 13–44)
M PROTEIN MFR SERPL ELPH: 2.6 %
M PROTEIN SPEC IFE-MCNC: NORMAL
MCHC RBC-ENTMCNC: 29 PG — SIGNIFICANT CHANGE UP (ref 27–34)
MCHC RBC-ENTMCNC: 32.7 G/DL — SIGNIFICANT CHANGE UP (ref 32–36)
MCV RBC AUTO: 88.8 FL — SIGNIFICANT CHANGE UP (ref 80–100)
MONOCLON BAND OBS SERPL: 0.2 G/DL
MONOCYTES # BLD AUTO: 0.41 K/UL — SIGNIFICANT CHANGE UP (ref 0–0.9)
MONOCYTES NFR BLD AUTO: 7.5 % — SIGNIFICANT CHANGE UP (ref 2–14)
NEUTROPHILS # BLD AUTO: 3.11 K/UL — SIGNIFICANT CHANGE UP (ref 1.8–7.4)
NEUTROPHILS NFR BLD AUTO: 56.5 % — SIGNIFICANT CHANGE UP (ref 43–77)
NRBC # BLD: 0 /100 WBCS — SIGNIFICANT CHANGE UP (ref 0–0)
PLATELET # BLD AUTO: 128 K/UL — LOW (ref 150–400)
POTASSIUM SERPL-SCNC: 4.5 MMOL/L
PROT SERPL-MCNC: 6.1 G/DL
PROT SERPL-MCNC: 6.2 G/DL
PROT SERPL-MCNC: 6.2 G/DL
RBC # BLD: 4.48 M/UL — SIGNIFICANT CHANGE UP (ref 4.2–5.8)
RBC # FLD: 14.3 % — SIGNIFICANT CHANGE UP (ref 10.3–14.5)
SODIUM SERPL-SCNC: 139 MMOL/L
WBC # BLD: 5.5 K/UL — SIGNIFICANT CHANGE UP (ref 3.8–10.5)
WBC # FLD AUTO: 5.5 K/UL — SIGNIFICANT CHANGE UP (ref 3.8–10.5)

## 2021-10-14 PROCEDURE — 99213 OFFICE O/P EST LOW 20 MIN: CPT

## 2021-10-23 LAB
ALBUMIN SERPL ELPH-MCNC: 4 G/DL
ALP BLD-CCNC: 129 U/L
ALT SERPL-CCNC: 14 U/L
ANION GAP SERPL CALC-SCNC: 12 MMOL/L
AST SERPL-CCNC: 18 U/L
B2 MICROGLOB SERPL-MCNC: 2.2 MG/L
BILIRUB SERPL-MCNC: 0.3 MG/DL
BUN SERPL-MCNC: 13 MG/DL
CALCIUM SERPL-MCNC: 8.7 MG/DL
CHLORIDE SERPL-SCNC: 104 MMOL/L
CO2 SERPL-SCNC: 24 MMOL/L
CREAT SERPL-MCNC: 0.88 MG/DL
DEPRECATED KAPPA LC FREE/LAMBDA SER: 3.63 RATIO
DEPRECATED KAPPA LC FREE/LAMBDA SER: 3.63 RATIO
GLUCOSE SERPL-MCNC: 91 MG/DL
HBV DNA # SERPL NAA+PROBE: NOT DETECTED IU/ML
HEPB DNA PCR LOG: NOT DETECTED LOG10IU/ML
IGA SER QL IEP: 51 MG/DL
IGG SER QL IEP: 826 MG/DL
IGM SER QL IEP: 218 MG/DL
KAPPA LC CSF-MCNC: 1.06 MG/DL
KAPPA LC CSF-MCNC: 1.06 MG/DL
KAPPA LC SERPL-MCNC: 3.85 MG/DL
KAPPA LC SERPL-MCNC: 3.85 MG/DL
LDH SERPL-CCNC: 154 U/L
POTASSIUM SERPL-SCNC: 4.6 MMOL/L
PROT SERPL-MCNC: 6.2 G/DL
SODIUM SERPL-SCNC: 140 MMOL/L

## 2021-10-23 NOTE — ASSESSMENT
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

## 2021-10-23 NOTE — HISTORY OF PRESENT ILLNESS
[Treatment Protocol] : Treatment Protocol [de-identified] : Mr. Meyer is a 63 year-old male who was diagnosed with CLL in 2003. He has been followed intermittently and was loss of a f/u with hematology. When seen on 5/20/03 the Wbc was 33.5, and was described as progressive.He was seen at the [facility] by Dr. Schulte. \par He was also seen at [facility] ED on 6/14/19 with severe anemia, Hgb of 6.8.  At that time he reported feeling severe fatigue and weakness for years, the Wbc was 13.54, Hgb 6.8, MCV 94.8, Plt 87K, the Anc was 1.22. The CMP showed no abnormalities. 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 He had dyspnea pretransfusion which has improved. He described weight loss back to 2003 when first diagnosed and has more recently stabilized for the last two years. 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 \par The records are incomplete but has been occasionally followed up by Dr. Schulte a hematologist in [facility].\par He c/o significant fatigue that has been worsening over the years.  [FreeTextEntry1] : Imbruvica and weekly Rituxan x 4 x 2 rounds last 11/18 [de-identified] : Waldtrom -  maintained on Imbruvica  completed weekly Rituxan x 4 completed second round  starting on 11/18/19 \par (e.g. 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

## 2022-01-12 ENCOUNTER — OUTPATIENT (OUTPATIENT)
Dept: OUTPATIENT SERVICES | Facility: HOSPITAL | Age: 67
LOS: 1 days | Discharge: HOME | End: 2022-01-12

## 2022-01-12 DIAGNOSIS — C91.10 CHRONIC LYMPHOCYTIC LEUKEMIA OF B-CELL TYPE NOT HAVING ACHIEVED REMISSION: ICD-10-CM

## 2022-01-13 ENCOUNTER — APPOINTMENT (OUTPATIENT)
Dept: HEMATOLOGY ONCOLOGY | Facility: CLINIC | Age: 67
End: 2022-01-13

## 2022-06-15 ENCOUNTER — APPOINTMENT (OUTPATIENT)
Dept: MRI IMAGING | Facility: IMAGING CENTER | Age: 67
End: 2022-06-15
Payer: COMMERCIAL

## 2022-06-15 ENCOUNTER — OUTPATIENT (OUTPATIENT)
Dept: OUTPATIENT SERVICES | Facility: HOSPITAL | Age: 67
LOS: 1 days | End: 2022-06-15
Payer: COMMERCIAL

## 2022-06-15 DIAGNOSIS — M54.12 RADICULOPATHY, CERVICAL REGION: ICD-10-CM

## 2022-06-15 DIAGNOSIS — S12.600A UNSPECIFIED DISPLACED FRACTURE OF SEVENTH CERVICAL VERTEBRA, INITIAL ENCOUNTER FOR CLOSED FRACTURE: ICD-10-CM

## 2022-06-15 PROCEDURE — 72141 MRI NECK SPINE W/O DYE: CPT

## 2022-06-15 PROCEDURE — 72141 MRI NECK SPINE W/O DYE: CPT | Mod: 26

## 2022-06-29 RX ORDER — IBRUTINIB 420 MG/1
420 TABLET, FILM COATED ORAL
Qty: 90 | Refills: 3 | Status: ACTIVE | COMMUNITY
Start: 2022-06-29 | End: 1900-01-01

## 2022-08-03 ENCOUNTER — OUTPATIENT (OUTPATIENT)
Dept: OUTPATIENT SERVICES | Facility: HOSPITAL | Age: 67
LOS: 1 days | Discharge: HOME | End: 2022-08-03

## 2022-08-03 DIAGNOSIS — C91.10 CHRONIC LYMPHOCYTIC LEUKEMIA OF B-CELL TYPE NOT HAVING ACHIEVED REMISSION: ICD-10-CM

## 2022-08-08 ENCOUNTER — RESULT REVIEW (OUTPATIENT)
Age: 67
End: 2022-08-08

## 2022-08-08 ENCOUNTER — APPOINTMENT (OUTPATIENT)
Dept: HEMATOLOGY ONCOLOGY | Facility: CLINIC | Age: 67
End: 2022-08-08

## 2022-08-08 VITALS
OXYGEN SATURATION: 94 % | TEMPERATURE: 97.2 F | HEART RATE: 70 BPM | BODY MASS INDEX: 23.98 KG/M2 | SYSTOLIC BLOOD PRESSURE: 150 MMHG | DIASTOLIC BLOOD PRESSURE: 77 MMHG | RESPIRATION RATE: 17 BRPM | WEIGHT: 171.3 LBS

## 2022-08-08 DIAGNOSIS — Z78.9 OTHER SPECIFIED HEALTH STATUS: ICD-10-CM

## 2022-08-08 DIAGNOSIS — F17.210 NICOTINE DEPENDENCE, CIGARETTES, UNCOMPLICATED: ICD-10-CM

## 2022-08-08 LAB
B2 MICROGLOB SERPL-MCNC: 2.4 MG/L
BASOPHILS # BLD AUTO: 0.08 K/UL — SIGNIFICANT CHANGE UP (ref 0–0.2)
BASOPHILS NFR BLD AUTO: 1 % — SIGNIFICANT CHANGE UP (ref 0–2)
DEPRECATED KAPPA LC FREE/LAMBDA SER: 3.88 RATIO
EOSINOPHIL # BLD AUTO: 0.3 K/UL — SIGNIFICANT CHANGE UP (ref 0–0.5)
EOSINOPHIL NFR BLD AUTO: 3.7 % — SIGNIFICANT CHANGE UP (ref 0–6)
HCT VFR BLD CALC: 41.4 % — SIGNIFICANT CHANGE UP (ref 39–50)
HGB BLD-MCNC: 13.2 G/DL — SIGNIFICANT CHANGE UP (ref 13–17)
IMM GRANULOCYTES NFR BLD AUTO: 0.2 % — SIGNIFICANT CHANGE UP (ref 0–1.5)
KAPPA LC CSF-MCNC: 1.18 MG/DL
KAPPA LC SERPL-MCNC: 4.58 MG/DL
LYMPHOCYTES # BLD AUTO: 2.11 K/UL — SIGNIFICANT CHANGE UP (ref 1–3.3)
LYMPHOCYTES # BLD AUTO: 26 % — SIGNIFICANT CHANGE UP (ref 13–44)
MCHC RBC-ENTMCNC: 30 PG — SIGNIFICANT CHANGE UP (ref 27–34)
MCHC RBC-ENTMCNC: 31.9 G/DL — LOW (ref 32–36)
MCV RBC AUTO: 94.1 FL — SIGNIFICANT CHANGE UP (ref 80–100)
MONOCYTES # BLD AUTO: 0.66 K/UL — SIGNIFICANT CHANGE UP (ref 0–0.9)
MONOCYTES NFR BLD AUTO: 8.1 % — SIGNIFICANT CHANGE UP (ref 2–14)
NEUTROPHILS # BLD AUTO: 4.94 K/UL — SIGNIFICANT CHANGE UP (ref 1.8–7.4)
NEUTROPHILS NFR BLD AUTO: 61 % — SIGNIFICANT CHANGE UP (ref 43–77)
NRBC # BLD: 0 /100 WBCS — SIGNIFICANT CHANGE UP (ref 0–0)
PLATELET # BLD AUTO: 147 K/UL — LOW (ref 150–400)
RBC # BLD: 4.4 M/UL — SIGNIFICANT CHANGE UP (ref 4.2–5.8)
RBC # FLD: 14.1 % — SIGNIFICANT CHANGE UP (ref 10.3–14.5)
WBC # BLD: 8.11 K/UL — SIGNIFICANT CHANGE UP (ref 3.8–10.5)
WBC # FLD AUTO: 8.11 K/UL — SIGNIFICANT CHANGE UP (ref 3.8–10.5)

## 2022-08-08 PROCEDURE — 99214 OFFICE O/P EST MOD 30 MIN: CPT

## 2022-08-08 RX ORDER — IBRUTINIB 420 MG/1
420 TABLET, FILM COATED ORAL
Qty: 30 | Refills: 5 | Status: COMPLETED | COMMUNITY
Start: 2021-10-14 | End: 2022-08-08

## 2022-08-08 RX ORDER — TENOFOVIR DISOPROXIL FUMARATE 300 MG/1
300 TABLET ORAL DAILY
Qty: 30 | Refills: 5 | Status: COMPLETED | COMMUNITY
Start: 2021-10-14 | End: 2022-08-08

## 2022-08-08 RX ORDER — TENOFOVIR DISOPROXIL FUMARATE 300 MG/1
300 TABLET ORAL
Qty: 90 | Refills: 2 | Status: COMPLETED | COMMUNITY
Start: 2021-04-30 | End: 2022-08-08

## 2022-09-04 PROBLEM — F17.210 CIGARETTE SMOKER: Status: ACTIVE | Noted: 2019-06-13

## 2022-09-04 PROBLEM — Z78.9 ALCOHOL INGESTION: Status: ACTIVE | Noted: 2019-06-25

## 2022-09-04 LAB
ALBUMIN MFR SERPL ELPH: 63 %
ALBUMIN SERPL ELPH-MCNC: 4.5 G/DL
ALBUMIN SERPL-MCNC: 4.1 G/DL
ALBUMIN/GLOB SERPL: 1.7 RATIO
ALP BLD-CCNC: 110 U/L
ALPHA1 GLOB MFR SERPL ELPH: 4.8 %
ALPHA1 GLOB SERPL ELPH-MCNC: 0.3 G/DL
ALPHA2 GLOB MFR SERPL ELPH: 10.2 %
ALPHA2 GLOB SERPL ELPH-MCNC: 0.7 G/DL
ALT SERPL-CCNC: 14 U/L
ANION GAP SERPL CALC-SCNC: 11 MMOL/L
AST SERPL-CCNC: 22 U/L
B-GLOBULIN MFR SERPL ELPH: 11.5 %
B-GLOBULIN SERPL ELPH-MCNC: 0.7 G/DL
B2 MICROGLOB SERPL-MCNC: 2.4 MG/L
BILIRUB SERPL-MCNC: 0.3 MG/DL
BUN SERPL-MCNC: 13 MG/DL
CALCIUM SERPL-MCNC: 9.1 MG/DL
CHLORIDE SERPL-SCNC: 103 MMOL/L
CO2 SERPL-SCNC: 27 MMOL/L
CREAT SERPL-MCNC: 0.9 MG/DL
DEPRECATED KAPPA LC FREE/LAMBDA SER: 4.67 RATIO
DEPRECATED KAPPA LC FREE/LAMBDA SER: 4.67 RATIO
EGFR: 95 ML/MIN/1.73M2
GAMMA GLOB FLD ELPH-MCNC: 0.7 G/DL
GAMMA GLOB MFR SERPL ELPH: 10.5 %
GLUCOSE SERPL-MCNC: 81 MG/DL
HEPB DNA PCR INT: NOT DETECTED
HEPB DNA PCR LOG: NOT DETECTED LOGIU/ML
IGA SER QL IEP: 53 MG/DL
IGG SER QL IEP: 786 MG/DL
IGM SER QL IEP: 246 MG/DL
INTERPRETATION SERPL IEP-IMP: NORMAL
KAPPA LC CSF-MCNC: 1.14 MG/DL
KAPPA LC CSF-MCNC: 1.14 MG/DL
KAPPA LC SERPL-MCNC: 5.32 MG/DL
KAPPA LC SERPL-MCNC: 5.32 MG/DL
LDH SERPL-CCNC: 205 U/L
M PROTEIN MFR SERPL ELPH: 3.3 %
M PROTEIN SPEC IFE-MCNC: NORMAL
MONOCLON BAND OBS SERPL: 0.2 G/DL
POTASSIUM SERPL-SCNC: 4.2 MMOL/L
PROT SERPL-MCNC: 6.5 G/DL
SODIUM SERPL-SCNC: 141 MMOL/L
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
## 2022-09-04 NOTE — ASSESSMENT
[Palliative] : Goals of care discussed with patient: Palliative [FreeTextEntry1] : CD5 (-) B cell lymphoproliferative disorder - MYD88 mutated -with ongoing response to ibrutinib\par Tolerating rx well\par Hep C and Hep B antibody (+)\par To cont follow LFTs, cont tenofovir\par check Hep B quant DNA PCR\par CBC stable today\par Mild thrombocytopenia unchanged\par Has IgM, IgG M-spikes - stable, cont to follow\par Check CMP, LDH, beta 2 MG\par cont imbruvica 400 mg/d\par Urged again to stop smoking \par no COVID vaccine \par RV 4 mos [Palliative Care Plan] : not applicable at this time

## 2022-11-30 ENCOUNTER — OUTPATIENT (OUTPATIENT)
Dept: OUTPATIENT SERVICES | Facility: HOSPITAL | Age: 67
LOS: 1 days | Discharge: HOME | End: 2022-11-30

## 2022-11-30 DIAGNOSIS — C91.10 CHRONIC LYMPHOCYTIC LEUKEMIA OF B-CELL TYPE NOT HAVING ACHIEVED REMISSION: ICD-10-CM

## 2022-12-05 ENCOUNTER — APPOINTMENT (OUTPATIENT)
Dept: HEMATOLOGY ONCOLOGY | Facility: CLINIC | Age: 67
End: 2022-12-05

## 2023-01-03 ENCOUNTER — APPOINTMENT (OUTPATIENT)
Dept: HEMATOLOGY ONCOLOGY | Facility: CLINIC | Age: 68
End: 2023-01-03
Payer: MEDICARE

## 2023-01-03 ENCOUNTER — RESULT REVIEW (OUTPATIENT)
Age: 68
End: 2023-01-03

## 2023-01-03 ENCOUNTER — APPOINTMENT (OUTPATIENT)
Dept: HEMATOLOGY ONCOLOGY | Facility: CLINIC | Age: 68
End: 2023-01-03

## 2023-01-03 VITALS
HEART RATE: 87 BPM | BODY MASS INDEX: 24.87 KG/M2 | TEMPERATURE: 97.9 F | RESPIRATION RATE: 16 BRPM | OXYGEN SATURATION: 96 % | WEIGHT: 177.69 LBS | DIASTOLIC BLOOD PRESSURE: 82 MMHG | SYSTOLIC BLOOD PRESSURE: 144 MMHG

## 2023-01-03 LAB
BASOPHILS # BLD AUTO: 0.07 K/UL — SIGNIFICANT CHANGE UP (ref 0–0.2)
BASOPHILS NFR BLD AUTO: 1.3 % — SIGNIFICANT CHANGE UP (ref 0–2)
EOSINOPHIL # BLD AUTO: 0.19 K/UL — SIGNIFICANT CHANGE UP (ref 0–0.5)
EOSINOPHIL NFR BLD AUTO: 3.5 % — SIGNIFICANT CHANGE UP (ref 0–6)
HCT VFR BLD CALC: 42.1 % — SIGNIFICANT CHANGE UP (ref 39–50)
HGB BLD-MCNC: 13.4 G/DL — SIGNIFICANT CHANGE UP (ref 13–17)
IMM GRANULOCYTES NFR BLD AUTO: 0.4 % — SIGNIFICANT CHANGE UP (ref 0–0.9)
LYMPHOCYTES # BLD AUTO: 1.83 K/UL — SIGNIFICANT CHANGE UP (ref 1–3.3)
LYMPHOCYTES # BLD AUTO: 33.6 % — SIGNIFICANT CHANGE UP (ref 13–44)
MCHC RBC-ENTMCNC: 29.7 PG — SIGNIFICANT CHANGE UP (ref 27–34)
MCHC RBC-ENTMCNC: 31.8 G/DL — LOW (ref 32–36)
MCV RBC AUTO: 93.3 FL — SIGNIFICANT CHANGE UP (ref 80–100)
MONOCYTES # BLD AUTO: 0.46 K/UL — SIGNIFICANT CHANGE UP (ref 0–0.9)
MONOCYTES NFR BLD AUTO: 8.5 % — SIGNIFICANT CHANGE UP (ref 2–14)
NEUTROPHILS # BLD AUTO: 2.87 K/UL — SIGNIFICANT CHANGE UP (ref 1.8–7.4)
NEUTROPHILS NFR BLD AUTO: 52.7 % — SIGNIFICANT CHANGE UP (ref 43–77)
NRBC # BLD: 0 /100 WBCS — SIGNIFICANT CHANGE UP (ref 0–0)
PLATELET # BLD AUTO: 123 K/UL — LOW (ref 150–400)
RBC # BLD: 4.51 M/UL — SIGNIFICANT CHANGE UP (ref 4.2–5.8)
RBC # FLD: 13.8 % — SIGNIFICANT CHANGE UP (ref 10.3–14.5)
WBC # BLD: 5.44 K/UL — SIGNIFICANT CHANGE UP (ref 3.8–10.5)
WBC # FLD AUTO: 5.44 K/UL — SIGNIFICANT CHANGE UP (ref 3.8–10.5)

## 2023-01-03 PROCEDURE — 99214 OFFICE O/P EST MOD 30 MIN: CPT

## 2023-01-03 RX ORDER — NICOTINE 4 MG/1
10 INHALANT RESPIRATORY (INHALATION)
Qty: 168 | Refills: 0 | Status: DISCONTINUED | COMMUNITY
Start: 2022-08-05 | End: 2023-01-03

## 2023-01-04 LAB
ALBUMIN SERPL ELPH-MCNC: 4.2 G/DL
ALP BLD-CCNC: 102 U/L
ALT SERPL-CCNC: 17 U/L
ANION GAP SERPL CALC-SCNC: 8 MMOL/L
AST SERPL-CCNC: 37 U/L
BILIRUB SERPL-MCNC: 0.2 MG/DL
BUN SERPL-MCNC: 12 MG/DL
CALCIUM SERPL-MCNC: 9.1 MG/DL
CHLORIDE SERPL-SCNC: 101 MMOL/L
CO2 SERPL-SCNC: 29 MMOL/L
CREAT SERPL-MCNC: 0.83 MG/DL
DEPRECATED KAPPA LC FREE/LAMBDA SER: 4.95 RATIO
EGFR: 97 ML/MIN/1.73M2
GLUCOSE SERPL-MCNC: 87 MG/DL
HEPB DNA PCR INT: NOT DETECTED
HEPB DNA PCR LOG: NOT DETECTED LOGIU/ML
KAPPA LC CSF-MCNC: 1.09 MG/DL
KAPPA LC SERPL-MCNC: 5.4 MG/DL
POTASSIUM SERPL-SCNC: 5.3 MMOL/L
PROT SERPL-MCNC: 6.4 G/DL
SODIUM SERPL-SCNC: 139 MMOL/L
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
## 2023-01-04 NOTE — ASSESSMENT
[FreeTextEntry1] : CD5 (-) B cell lymphoproliferative disorder - MYD88 mutated -with ongoing response to ibrutinib\par Tolerating rx well\par Hep C and Hep B antibody (+)\par To cont follow LFTs, cont tenofovir\par check Hep B quant DNA PCR\par CBC stable today\par Mild thrombocytopenia unchanged\par Has IgM, IgG M-spikes - stable, cont to follow\par Check CMP, LDH, beta 2 MG\par cont imbruvica 400 mg/d\par Urged again to stop smoking \par no COVID vaccine \par RV 4 mos [Palliative] : Goals of care discussed with patient: Palliative [Palliative Care Plan] : not applicable at this time

## 2023-05-01 ENCOUNTER — OUTPATIENT (OUTPATIENT)
Dept: OUTPATIENT SERVICES | Facility: HOSPITAL | Age: 68
LOS: 1 days | Discharge: HOME | End: 2023-05-01

## 2023-05-01 DIAGNOSIS — C91.10 CHRONIC LYMPHOCYTIC LEUKEMIA OF B-CELL TYPE NOT HAVING ACHIEVED REMISSION: ICD-10-CM

## 2023-05-09 ENCOUNTER — APPOINTMENT (OUTPATIENT)
Dept: HEMATOLOGY ONCOLOGY | Facility: CLINIC | Age: 68
End: 2023-05-09
Payer: MEDICARE

## 2023-05-09 ENCOUNTER — RESULT REVIEW (OUTPATIENT)
Age: 68
End: 2023-05-09

## 2023-05-09 VITALS
BODY MASS INDEX: 26.16 KG/M2 | TEMPERATURE: 98.4 F | HEIGHT: 68.74 IN | OXYGEN SATURATION: 95 % | DIASTOLIC BLOOD PRESSURE: 80 MMHG | WEIGHT: 176.59 LBS | SYSTOLIC BLOOD PRESSURE: 163 MMHG | HEART RATE: 77 BPM | RESPIRATION RATE: 16 BRPM

## 2023-05-09 DIAGNOSIS — R76.8 OTHER SPECIFIED ABNORMAL IMMUNOLOGICAL FINDINGS IN SERUM: ICD-10-CM

## 2023-05-09 LAB
ALBUMIN MFR SERPL ELPH: 61.6 %
ALBUMIN SERPL ELPH-MCNC: 4.3 G/DL
ALBUMIN SERPL-MCNC: 3.9 G/DL
ALBUMIN/GLOB SERPL: 1.6 RATIO
ALP BLD-CCNC: 96 U/L
ALPHA1 GLOB MFR SERPL ELPH: 5 %
ALPHA1 GLOB SERPL ELPH-MCNC: 0.3 G/DL
ALPHA2 GLOB MFR SERPL ELPH: 10.5 %
ALPHA2 GLOB SERPL ELPH-MCNC: 0.7 G/DL
ALT SERPL-CCNC: 17 U/L
ANION GAP SERPL CALC-SCNC: 11 MMOL/L
AST SERPL-CCNC: 20 U/L
B-GLOBULIN MFR SERPL ELPH: 12.1 %
B-GLOBULIN SERPL ELPH-MCNC: 0.8 G/DL
BASOPHILS # BLD AUTO: 0.06 K/UL — SIGNIFICANT CHANGE UP (ref 0–0.2)
BASOPHILS NFR BLD AUTO: 0.8 % — SIGNIFICANT CHANGE UP (ref 0–2)
BILIRUB SERPL-MCNC: 0.4 MG/DL
BUN SERPL-MCNC: 20 MG/DL
CALCIUM SERPL-MCNC: 9.4 MG/DL
CHLORIDE SERPL-SCNC: 105 MMOL/L
CO2 SERPL-SCNC: 26 MMOL/L
CREAT SERPL-MCNC: 0.92 MG/DL
DEPRECATED KAPPA LC FREE/LAMBDA SER: 4.95 RATIO
EGFR: 92 ML/MIN/1.73M2
EOSINOPHIL # BLD AUTO: 0.31 K/UL — SIGNIFICANT CHANGE UP (ref 0–0.5)
EOSINOPHIL NFR BLD AUTO: 4.3 % — SIGNIFICANT CHANGE UP (ref 0–6)
GAMMA GLOB FLD ELPH-MCNC: 0.7 G/DL
GAMMA GLOB MFR SERPL ELPH: 10.8 %
GLUCOSE SERPL-MCNC: 84 MG/DL
HCT VFR BLD CALC: 41.7 % — SIGNIFICANT CHANGE UP (ref 39–50)
HGB BLD-MCNC: 13.4 G/DL — SIGNIFICANT CHANGE UP (ref 13–17)
IGA SER QL IEP: 65 MG/DL
IGG SER QL IEP: 881 MG/DL
IGM SER QL IEP: 263 MG/DL
IMM GRANULOCYTES NFR BLD AUTO: 0.4 % — SIGNIFICANT CHANGE UP (ref 0–0.9)
INTERPRETATION SERPL IEP-IMP: NORMAL
KAPPA LC CSF-MCNC: 1.09 MG/DL
KAPPA LC SERPL-MCNC: 5.4 MG/DL
LYMPHOCYTES # BLD AUTO: 2.07 K/UL — SIGNIFICANT CHANGE UP (ref 1–3.3)
LYMPHOCYTES # BLD AUTO: 28.6 % — SIGNIFICANT CHANGE UP (ref 13–44)
M PROTEIN MFR SERPL ELPH: 3.2 %
M PROTEIN SPEC IFE-MCNC: NORMAL
MCHC RBC-ENTMCNC: 29.6 PG — SIGNIFICANT CHANGE UP (ref 27–34)
MCHC RBC-ENTMCNC: 32.1 G/DL — SIGNIFICANT CHANGE UP (ref 32–36)
MCV RBC AUTO: 92.3 FL — SIGNIFICANT CHANGE UP (ref 80–100)
MONOCLON BAND OBS SERPL: 0.2 G/DL
MONOCYTES # BLD AUTO: 0.66 K/UL — SIGNIFICANT CHANGE UP (ref 0–0.9)
MONOCYTES NFR BLD AUTO: 9.1 % — SIGNIFICANT CHANGE UP (ref 2–14)
NEUTROPHILS # BLD AUTO: 4.11 K/UL — SIGNIFICANT CHANGE UP (ref 1.8–7.4)
NEUTROPHILS NFR BLD AUTO: 56.8 % — SIGNIFICANT CHANGE UP (ref 43–77)
NRBC # BLD: 0 /100 WBCS — SIGNIFICANT CHANGE UP (ref 0–0)
PLATELET # BLD AUTO: 111 K/UL — LOW (ref 150–400)
POTASSIUM SERPL-SCNC: 4.5 MMOL/L
PROT SERPL-MCNC: 6.4 G/DL
PROT SERPL-MCNC: 6.4 G/DL
PROT SERPL-MCNC: 6.7 G/DL
RBC # BLD: 4.52 M/UL — SIGNIFICANT CHANGE UP (ref 4.2–5.8)
RBC # FLD: 14 % — SIGNIFICANT CHANGE UP (ref 10.3–14.5)
SODIUM SERPL-SCNC: 142 MMOL/L
WBC # BLD: 7.24 K/UL — SIGNIFICANT CHANGE UP (ref 3.8–10.5)
WBC # FLD AUTO: 7.24 K/UL — SIGNIFICANT CHANGE UP (ref 3.8–10.5)

## 2023-05-09 PROCEDURE — 99213 OFFICE O/P EST LOW 20 MIN: CPT

## 2023-05-14 LAB
ALBUMIN MFR SERPL ELPH: 62.3 %
ALBUMIN SERPL-MCNC: 4.2 G/DL
ALBUMIN/GLOB SERPL: 1.7 RATIO
ALPHA1 GLOB MFR SERPL ELPH: 4.1 %
ALPHA1 GLOB SERPL ELPH-MCNC: 0.3 G/DL
ALPHA2 GLOB MFR SERPL ELPH: 10 %
ALPHA2 GLOB SERPL ELPH-MCNC: 0.7 G/DL
B-GLOBULIN MFR SERPL ELPH: 12.6 %
B-GLOBULIN SERPL ELPH-MCNC: 0.8 G/DL
DEPRECATED KAPPA LC FREE/LAMBDA SER: 4.27 RATIO
GAMMA GLOB FLD ELPH-MCNC: 0.7 G/DL
GAMMA GLOB MFR SERPL ELPH: 11 %
IGA SER QL IEP: 59 MG/DL
IGG SER QL IEP: 793 MG/DL
IGM SER QL IEP: 253 MG/DL
INTERPRETATION SERPL IEP-IMP: NORMAL
KAPPA LC CSF-MCNC: 1.28 MG/DL
KAPPA LC SERPL-MCNC: 5.47 MG/DL
M PROTEIN MFR SERPL ELPH: 2.8 %
M PROTEIN SPEC IFE-MCNC: NORMAL
MONOCLON BAND OBS SERPL: 0.2 G/DL
PROT SERPL-MCNC: 6.7 G/DL
PROT SERPL-MCNC: 6.7 G/DL
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
## 2023-09-07 ENCOUNTER — OUTPATIENT (OUTPATIENT)
Dept: OUTPATIENT SERVICES | Facility: HOSPITAL | Age: 68
LOS: 1 days | Discharge: HOME | End: 2023-09-07

## 2023-09-07 DIAGNOSIS — C91.10 CHRONIC LYMPHOCYTIC LEUKEMIA OF B-CELL TYPE NOT HAVING ACHIEVED REMISSION: ICD-10-CM

## 2023-09-25 ENCOUNTER — APPOINTMENT (OUTPATIENT)
Dept: HEMATOLOGY ONCOLOGY | Facility: CLINIC | Age: 68
End: 2023-09-25

## 2023-09-25 ENCOUNTER — APPOINTMENT (OUTPATIENT)
Dept: HEMATOLOGY ONCOLOGY | Facility: CLINIC | Age: 68
End: 2023-09-25
Payer: MEDICARE

## 2023-09-25 ENCOUNTER — RESULT REVIEW (OUTPATIENT)
Age: 68
End: 2023-09-25

## 2023-09-25 VITALS
HEART RATE: 69 BPM | RESPIRATION RATE: 13 BRPM | SYSTOLIC BLOOD PRESSURE: 126 MMHG | BODY MASS INDEX: 26.73 KG/M2 | TEMPERATURE: 97.7 F | WEIGHT: 179.68 LBS | OXYGEN SATURATION: 99 % | DIASTOLIC BLOOD PRESSURE: 81 MMHG

## 2023-09-25 DIAGNOSIS — F11.20 OPIOID DEPENDENCE, UNCOMPLICATED: ICD-10-CM

## 2023-09-25 LAB
BASOPHILS # BLD AUTO: 0.05 K/UL — SIGNIFICANT CHANGE UP (ref 0–0.2)
BASOPHILS NFR BLD AUTO: 0.8 % — SIGNIFICANT CHANGE UP (ref 0–2)
EOSINOPHIL # BLD AUTO: 0.15 K/UL — SIGNIFICANT CHANGE UP (ref 0–0.5)
EOSINOPHIL NFR BLD AUTO: 2.5 % — SIGNIFICANT CHANGE UP (ref 0–6)
HCT VFR BLD CALC: 40.6 % — SIGNIFICANT CHANGE UP (ref 39–50)
HGB BLD-MCNC: 13.5 G/DL — SIGNIFICANT CHANGE UP (ref 13–17)
IMM GRANULOCYTES NFR BLD AUTO: 0.3 % — SIGNIFICANT CHANGE UP (ref 0–0.9)
LYMPHOCYTES # BLD AUTO: 1.53 K/UL — SIGNIFICANT CHANGE UP (ref 1–3.3)
LYMPHOCYTES # BLD AUTO: 25 % — SIGNIFICANT CHANGE UP (ref 13–44)
MCHC RBC-ENTMCNC: 30.3 PG — SIGNIFICANT CHANGE UP (ref 27–34)
MCHC RBC-ENTMCNC: 33.3 G/DL — SIGNIFICANT CHANGE UP (ref 32–36)
MCV RBC AUTO: 91.2 FL — SIGNIFICANT CHANGE UP (ref 80–100)
MONOCYTES # BLD AUTO: 0.48 K/UL — SIGNIFICANT CHANGE UP (ref 0–0.9)
MONOCYTES NFR BLD AUTO: 7.9 % — SIGNIFICANT CHANGE UP (ref 2–14)
NEUTROPHILS # BLD AUTO: 3.88 K/UL — SIGNIFICANT CHANGE UP (ref 1.8–7.4)
NEUTROPHILS NFR BLD AUTO: 63.5 % — SIGNIFICANT CHANGE UP (ref 43–77)
NRBC # BLD: 0 /100 WBCS — SIGNIFICANT CHANGE UP (ref 0–0)
PLATELET # BLD AUTO: 141 K/UL — LOW (ref 150–400)
RBC # BLD: 4.45 M/UL — SIGNIFICANT CHANGE UP (ref 4.2–5.8)
RBC # FLD: 14 % — SIGNIFICANT CHANGE UP (ref 10.3–14.5)
WBC # BLD: 6.11 K/UL — SIGNIFICANT CHANGE UP (ref 3.8–10.5)
WBC # FLD AUTO: 6.11 K/UL — SIGNIFICANT CHANGE UP (ref 3.8–10.5)

## 2023-09-25 PROCEDURE — 99214 OFFICE O/P EST MOD 30 MIN: CPT

## 2023-10-01 LAB
ALBUMIN MFR SERPL ELPH: 59.9 %
ALBUMIN SERPL ELPH-MCNC: 4.5 G/DL
ALBUMIN SERPL-MCNC: 4 G/DL
ALBUMIN/GLOB SERPL: 1.5 RATIO
ALP BLD-CCNC: 116 U/L
ALPHA1 GLOB MFR SERPL ELPH: 4.7 %
ALPHA1 GLOB SERPL ELPH-MCNC: 0.3 G/DL
ALPHA2 GLOB MFR SERPL ELPH: 10.5 %
ALPHA2 GLOB SERPL ELPH-MCNC: 0.7 G/DL
ALT SERPL-CCNC: 20 U/L
ANION GAP SERPL CALC-SCNC: 8 MMOL/L
AST SERPL-CCNC: 20 U/L
B-GLOBULIN MFR SERPL ELPH: 13.9 %
B-GLOBULIN SERPL ELPH-MCNC: 0.9 G/DL
BILIRUB SERPL-MCNC: 0.2 MG/DL
BUN SERPL-MCNC: 15 MG/DL
CALCIUM SERPL-MCNC: 9.2 MG/DL
CHLORIDE SERPL-SCNC: 100 MMOL/L
CO2 SERPL-SCNC: 30 MMOL/L
CREAT SERPL-MCNC: 0.88 MG/DL
DEPRECATED KAPPA LC FREE/LAMBDA SER: 6.34 RATIO
EGFR: 95 ML/MIN/1.73M2
GAMMA GLOB FLD ELPH-MCNC: 0.7 G/DL
GAMMA GLOB MFR SERPL ELPH: 11 %
GLUCOSE SERPL-MCNC: 96 MG/DL
HEPB DNA PCR INT: NOT DETECTED
HEPB DNA PCR LOG: NOT DETECTED LOGIU/ML
IGA SER QL IEP: 77 MG/DL
IGG SER QL IEP: 874 MG/DL
IGM SER QL IEP: 341 MG/DL
INTERPRETATION SERPL IEP-IMP: NORMAL
KAPPA LC CSF-MCNC: 1.06 MG/DL
KAPPA LC SERPL-MCNC: 6.72 MG/DL
M PROTEIN MFR SERPL ELPH: 4.2 %
M PROTEIN SPEC IFE-MCNC: NORMAL
MONOCLON BAND OBS SERPL: 0.3 G/DL
POTASSIUM SERPL-SCNC: 4.5 MMOL/L
PROT SERPL-MCNC: 6.6 G/DL
SODIUM SERPL-SCNC: 137 MMOL/L

## 2023-12-05 ENCOUNTER — NON-APPOINTMENT (OUTPATIENT)
Age: 68
End: 2023-12-05

## 2024-01-16 ENCOUNTER — OUTPATIENT (OUTPATIENT)
Dept: OUTPATIENT SERVICES | Facility: HOSPITAL | Age: 69
LOS: 1 days | Discharge: HOME | End: 2024-01-16

## 2024-01-16 DIAGNOSIS — C91.10 CHRONIC LYMPHOCYTIC LEUKEMIA OF B-CELL TYPE NOT HAVING ACHIEVED REMISSION: ICD-10-CM

## 2024-01-22 ENCOUNTER — APPOINTMENT (OUTPATIENT)
Dept: HEMATOLOGY ONCOLOGY | Facility: CLINIC | Age: 69
End: 2024-01-22

## 2024-04-11 NOTE — ED CDU PROVIDER DISPOSITION NOTE - PRINCIPAL DIAGNOSIS
Problem: Pressure Injury - Risk of  Goal: *Prevention of pressure injury  Description: Document Munir Scale and appropriate interventions in the flowsheet.   Outcome: Progressing Towards Goal  Note: Pressure Injury Interventions:  Sensory Interventions: Maintain/enhance activity level    Moisture Interventions: Maintain skin hydration (lotion/cream)    Activity Interventions: Increase time out of bed    Mobility Interventions: PT/OT evaluation, HOB 30 degrees or less    Nutrition Interventions: Document food/fluid/supplement intake    Friction and Shear Interventions: HOB 30 degrees or less                Problem: Patient Education: Go to Patient Education Activity  Goal: Patient/Family Education  Outcome: Progressing Towards Goal     Problem: Patient Education: Go to Patient Education Activity  Goal: Patient/Family Education  Outcome: Progressing Towards Goal     Problem: Patient Education: Go to Patient Education Activity  Goal: Patient/Family Education  Outcome: Progressing Towards Goal
normal...
Anemia

## 2024-04-24 PROCEDURE — 93010 ELECTROCARDIOGRAM REPORT: CPT | Mod: 1L

## 2024-04-25 ENCOUNTER — OUTPATIENT (OUTPATIENT)
Dept: OUTPATIENT SERVICES | Facility: HOSPITAL | Age: 69
LOS: 1 days | Discharge: HOME | End: 2024-04-25

## 2024-04-25 DIAGNOSIS — C91.10 CHRONIC LYMPHOCYTIC LEUKEMIA OF B-CELL TYPE NOT HAVING ACHIEVED REMISSION: ICD-10-CM

## 2024-05-05 ENCOUNTER — NON-APPOINTMENT (OUTPATIENT)
Age: 69
End: 2024-05-05

## 2024-05-06 ENCOUNTER — APPOINTMENT (OUTPATIENT)
Dept: HEMATOLOGY ONCOLOGY | Facility: CLINIC | Age: 69
End: 2024-05-06
Payer: MEDICARE

## 2024-05-06 ENCOUNTER — RESULT REVIEW (OUTPATIENT)
Age: 69
End: 2024-05-06

## 2024-05-06 VITALS
TEMPERATURE: 98.5 F | BODY MASS INDEX: 26.14 KG/M2 | DIASTOLIC BLOOD PRESSURE: 78 MMHG | OXYGEN SATURATION: 97 % | HEART RATE: 73 BPM | WEIGHT: 175.71 LBS | RESPIRATION RATE: 16 BRPM | SYSTOLIC BLOOD PRESSURE: 138 MMHG

## 2024-05-06 DIAGNOSIS — Z51.11 ENCOUNTER FOR ANTINEOPLASTIC CHEMOTHERAPY: ICD-10-CM

## 2024-05-06 DIAGNOSIS — R76.8 OTHER SPECIFIED ABNORMAL IMMUNOLOGICAL FINDINGS IN SERUM: ICD-10-CM

## 2024-05-06 DIAGNOSIS — C88.0 WALDENSTROM MACROGLOBULINEMIA: ICD-10-CM

## 2024-05-06 LAB
BASOPHILS # BLD AUTO: 0.05 K/UL — SIGNIFICANT CHANGE UP (ref 0–0.2)
BASOPHILS NFR BLD AUTO: 0.6 % — SIGNIFICANT CHANGE UP (ref 0–2)
EOSINOPHIL # BLD AUTO: 0.26 K/UL — SIGNIFICANT CHANGE UP (ref 0–0.5)
EOSINOPHIL NFR BLD AUTO: 3.3 % — SIGNIFICANT CHANGE UP (ref 0–6)
HCT VFR BLD CALC: 43.5 % — SIGNIFICANT CHANGE UP (ref 39–50)
HGB BLD-MCNC: 14.4 G/DL — SIGNIFICANT CHANGE UP (ref 13–17)
IMM GRANULOCYTES NFR BLD AUTO: 0.3 % — SIGNIFICANT CHANGE UP (ref 0–0.9)
LYMPHOCYTES # BLD AUTO: 1.91 K/UL — SIGNIFICANT CHANGE UP (ref 1–3.3)
LYMPHOCYTES # BLD AUTO: 24.1 % — SIGNIFICANT CHANGE UP (ref 13–44)
MCHC RBC-ENTMCNC: 30.4 PG — SIGNIFICANT CHANGE UP (ref 27–34)
MCHC RBC-ENTMCNC: 33.1 G/DL — SIGNIFICANT CHANGE UP (ref 32–36)
MCV RBC AUTO: 92 FL — SIGNIFICANT CHANGE UP (ref 80–100)
MONOCYTES # BLD AUTO: 0.55 K/UL — SIGNIFICANT CHANGE UP (ref 0–0.9)
MONOCYTES NFR BLD AUTO: 6.9 % — SIGNIFICANT CHANGE UP (ref 2–14)
NEUTROPHILS # BLD AUTO: 5.14 K/UL — SIGNIFICANT CHANGE UP (ref 1.8–7.4)
NEUTROPHILS NFR BLD AUTO: 64.8 % — SIGNIFICANT CHANGE UP (ref 43–77)
NRBC # BLD: 0 /100 WBCS — SIGNIFICANT CHANGE UP (ref 0–0)
PLATELET # BLD AUTO: 145 K/UL — LOW (ref 150–400)
RBC # BLD: 4.73 M/UL — SIGNIFICANT CHANGE UP (ref 4.2–5.8)
RBC # FLD: 13.9 % — SIGNIFICANT CHANGE UP (ref 10.3–14.5)
WBC # BLD: 7.93 K/UL — SIGNIFICANT CHANGE UP (ref 3.8–10.5)
WBC # FLD AUTO: 7.93 K/UL — SIGNIFICANT CHANGE UP (ref 3.8–10.5)

## 2024-05-06 PROCEDURE — 99214 OFFICE O/P EST MOD 30 MIN: CPT

## 2024-05-06 RX ORDER — IBRUTINIB 420 MG/1
420 TABLET, FILM COATED ORAL
Qty: 90 | Refills: 3 | Status: ACTIVE | COMMUNITY
Start: 2020-11-18 | End: 1900-01-01

## 2024-05-06 RX ORDER — TENOFOVIR DISOPROXIL FUMARATE 300 MG/1
300 TABLET ORAL DAILY
Qty: 90 | Refills: 3 | Status: ACTIVE | COMMUNITY
Start: 2022-06-29 | End: 1900-01-01

## 2024-05-06 RX ORDER — TRIAMCINOLONE ACETONIDE 1 MG/G
0.1 OINTMENT TOPICAL
Qty: 1 | Refills: 0 | Status: DISCONTINUED | COMMUNITY
Start: 2019-09-24 | End: 2024-05-06

## 2024-05-21 LAB
ALBUMIN MFR SERPL ELPH: 58.7 %
ALBUMIN SERPL ELPH-MCNC: 4.1 G/DL
ALBUMIN SERPL-MCNC: 3.9 G/DL
ALBUMIN/GLOB SERPL: 1.4 RATIO
ALP BLD-CCNC: 113 U/L
ALPHA1 GLOB MFR SERPL ELPH: 4.3 %
ALPHA1 GLOB SERPL ELPH-MCNC: 0.3 G/DL
ALPHA2 GLOB MFR SERPL ELPH: 10.5 %
ALPHA2 GLOB SERPL ELPH-MCNC: 0.7 G/DL
ALT SERPL-CCNC: 14 U/L
ANION GAP SERPL CALC-SCNC: 12 MMOL/L
AST SERPL-CCNC: 17 U/L
B-GLOBULIN MFR SERPL ELPH: 16 %
B-GLOBULIN SERPL ELPH-MCNC: 1.1 G/DL
BILIRUB SERPL-MCNC: 0.6 MG/DL
BUN SERPL-MCNC: 12 MG/DL
CALCIUM SERPL-MCNC: 9.3 MG/DL
CHLORIDE SERPL-SCNC: 101 MMOL/L
CO2 SERPL-SCNC: 28 MMOL/L
CREAT SERPL-MCNC: 1.01 MG/DL
DEPRECATED KAPPA LC FREE/LAMBDA SER: 8.67 RATIO
EGFR: 82 ML/MIN/1.73M2
GAMMA GLOB FLD ELPH-MCNC: 0.7 G/DL
GAMMA GLOB MFR SERPL ELPH: 10.5 %
GLUCOSE SERPL-MCNC: 159 MG/DL
HCV RNA FLD QL NAA+PROBE: NORMAL
HCV RNA SPEC QL PROBE+SIG AMP: NOT DETECTED
HEPB DNA PCR INT: NOT DETECTED
HEPB DNA PCR LOG: NOT DETECTED LOGIU/ML
IGA SER QL IEP: 62 MG/DL
IGG SER QL IEP: 823 MG/DL
IGM SER QL IEP: 507 MG/DL
INTERPRETATION SERPL IEP-IMP: NORMAL
KAPPA LC CSF-MCNC: 0.91 MG/DL
KAPPA LC SERPL-MCNC: 7.89 MG/DL
LDH SERPL-CCNC: 153 U/L
M PROTEIN MFR SERPL ELPH: 7.7 %
M PROTEIN SPEC IFE-MCNC: NORMAL
MONOCLON BAND OBS SERPL: 0.5 G/DL
POTASSIUM SERPL-SCNC: 4.5 MMOL/L
PROT SERPL-MCNC: 6.7 G/DL
SODIUM SERPL-SCNC: 141 MMOL/L

## 2024-05-21 NOTE — REVIEW OF SYSTEMS
[Fatigue] : fatigue [Negative] : Allergic/Immunologic [FreeTextEntry2] : sleep poor  [de-identified] : has to move

## 2024-05-21 NOTE — HISTORY OF PRESENT ILLNESS
[Treatment Protocol] : Treatment Protocol [de-identified] : Mr. Meyer is a 63 year-old male who was diagnosed with CLL in 2003. He has been followed intermittently and was loss of a f/u with hematology. When seen on 5/20/03 the Wbc was 33.5, and was described as progressive.He was seen at the [facility] by Dr. Schulte. \par  He was also seen at [facility] ED on 6/14/19 with severe anemia, Hgb of 6.8.  At that time he reported feeling severe fatigue and weakness for years, the Wbc was 13.54, Hgb 6.8, MCV 94.8, Plt 87K, the Anc was 1.22. The CMP showed no abnormalities. 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 He had dyspnea pretransfusion which has improved. He described weight loss back to 2003 when first diagnosed and has more recently stabilized for the last two years. 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 \par  The records are incomplete but has been occasionally followed up by Dr. Schulte a hematologist in [facility].\par  He c/o significant fatigue that has been worsening over the years.  [FreeTextEntry1] : Imbruvica and weekly Rituxan x 4 x 2 rounds last 11/18 [de-identified] : Tiffanie -  maintained on Imbruvica  completed weekly Rituxan x 4 completed second round  starting on 11/18/19  (e.g. 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

## 2024-05-21 NOTE — ASSESSMENT
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

## 2024-08-23 ENCOUNTER — OUTPATIENT (OUTPATIENT)
Dept: OUTPATIENT SERVICES | Facility: HOSPITAL | Age: 69
LOS: 1 days | Discharge: HOME | End: 2024-08-23
Payer: MEDICARE

## 2024-08-23 DIAGNOSIS — C91.10 CHRONIC LYMPHOCYTIC LEUKEMIA OF B-CELL TYPE NOT HAVING ACHIEVED REMISSION: ICD-10-CM

## 2024-09-05 ENCOUNTER — APPOINTMENT (OUTPATIENT)
Dept: HEMATOLOGY ONCOLOGY | Facility: CLINIC | Age: 69
End: 2024-09-05

## 2024-09-10 ENCOUNTER — APPOINTMENT (OUTPATIENT)
Dept: HEMATOLOGY ONCOLOGY | Facility: CLINIC | Age: 69
End: 2024-09-10

## 2024-10-24 ENCOUNTER — OUTPATIENT (OUTPATIENT)
Dept: OUTPATIENT SERVICES | Facility: HOSPITAL | Age: 69
LOS: 1 days | Discharge: HOME | End: 2024-10-24

## 2024-10-24 DIAGNOSIS — C91.10 CHRONIC LYMPHOCYTIC LEUKEMIA OF B-CELL TYPE NOT HAVING ACHIEVED REMISSION: ICD-10-CM

## 2024-10-29 ENCOUNTER — RESULT REVIEW (OUTPATIENT)
Age: 69
End: 2024-10-29

## 2024-10-29 ENCOUNTER — APPOINTMENT (OUTPATIENT)
Dept: HEMATOLOGY ONCOLOGY | Facility: CLINIC | Age: 69
End: 2024-10-29
Payer: MEDICARE

## 2024-10-29 VITALS
BODY MASS INDEX: 24.77 KG/M2 | TEMPERATURE: 97.3 F | WEIGHT: 166.45 LBS | DIASTOLIC BLOOD PRESSURE: 73 MMHG | OXYGEN SATURATION: 97 % | SYSTOLIC BLOOD PRESSURE: 117 MMHG | RESPIRATION RATE: 16 BRPM | HEART RATE: 73 BPM

## 2024-10-29 DIAGNOSIS — R16.1 SPLENOMEGALY, NOT ELSEWHERE CLASSIFIED: ICD-10-CM

## 2024-10-29 DIAGNOSIS — C88.00 WALDENSTROM MACROGLOBULINEMIA NOT HAVING ACHIEVED REMISSION: ICD-10-CM

## 2024-10-29 DIAGNOSIS — D69.6 THROMBOCYTOPENIA, UNSPECIFIED: ICD-10-CM

## 2024-10-29 DIAGNOSIS — R76.8 OTHER SPECIFIED ABNORMAL IMMUNOLOGICAL FINDINGS IN SERUM: ICD-10-CM

## 2024-10-29 DIAGNOSIS — Z51.11 ENCOUNTER FOR ANTINEOPLASTIC CHEMOTHERAPY: ICD-10-CM

## 2024-10-29 DIAGNOSIS — F11.20 OPIOID DEPENDENCE, UNCOMPLICATED: ICD-10-CM

## 2024-10-29 LAB
ALBUMIN SERPL ELPH-MCNC: 4.2 G/DL
ALP BLD-CCNC: 102 U/L
ALT SERPL-CCNC: 18 U/L
ANION GAP SERPL CALC-SCNC: 13 MMOL/L
AST SERPL-CCNC: 21 U/L
BASOPHILS # BLD AUTO: 0.05 K/UL — SIGNIFICANT CHANGE UP (ref 0–0.2)
BASOPHILS NFR BLD AUTO: 0.7 % — SIGNIFICANT CHANGE UP (ref 0–2)
BILIRUB SERPL-MCNC: 0.3 MG/DL
BUN SERPL-MCNC: 16 MG/DL
CALCIUM SERPL-MCNC: 9.6 MG/DL
CHLORIDE SERPL-SCNC: 100 MMOL/L
CO2 SERPL-SCNC: 24 MMOL/L
CREAT SERPL-MCNC: 1.04 MG/DL
EGFR: 79 ML/MIN/1.73M2
EOSINOPHIL # BLD AUTO: 0.23 K/UL — SIGNIFICANT CHANGE UP (ref 0–0.5)
EOSINOPHIL NFR BLD AUTO: 3.4 % — SIGNIFICANT CHANGE UP (ref 0–6)
GLUCOSE SERPL-MCNC: 91 MG/DL
HCT VFR BLD CALC: 43.2 % — SIGNIFICANT CHANGE UP (ref 39–50)
HGB BLD-MCNC: 14.1 G/DL — SIGNIFICANT CHANGE UP (ref 13–17)
IMM GRANULOCYTES NFR BLD AUTO: 0.1 % — SIGNIFICANT CHANGE UP (ref 0–0.9)
LDH SERPL-CCNC: 147 U/L
LYMPHOCYTES # BLD AUTO: 2.48 K/UL — SIGNIFICANT CHANGE UP (ref 1–3.3)
LYMPHOCYTES # BLD AUTO: 37.1 % — SIGNIFICANT CHANGE UP (ref 13–44)
MCHC RBC-ENTMCNC: 30.2 PG — SIGNIFICANT CHANGE UP (ref 27–34)
MCHC RBC-ENTMCNC: 32.6 G/DL — SIGNIFICANT CHANGE UP (ref 32–36)
MCV RBC AUTO: 92.5 FL — SIGNIFICANT CHANGE UP (ref 80–100)
MONOCYTES # BLD AUTO: 0.45 K/UL — SIGNIFICANT CHANGE UP (ref 0–0.9)
MONOCYTES NFR BLD AUTO: 6.7 % — SIGNIFICANT CHANGE UP (ref 2–14)
NEUTROPHILS # BLD AUTO: 3.46 K/UL — SIGNIFICANT CHANGE UP (ref 1.8–7.4)
NEUTROPHILS NFR BLD AUTO: 52 % — SIGNIFICANT CHANGE UP (ref 43–77)
NRBC # BLD: 0 /100 WBCS — SIGNIFICANT CHANGE UP (ref 0–0)
PLATELET # BLD AUTO: 150 K/UL — SIGNIFICANT CHANGE UP (ref 150–400)
POTASSIUM SERPL-SCNC: 4.3 MMOL/L
PROT SERPL-MCNC: 6.7 G/DL
RBC # BLD: 4.67 M/UL — SIGNIFICANT CHANGE UP (ref 4.2–5.8)
RBC # FLD: 13.9 % — SIGNIFICANT CHANGE UP (ref 10.3–14.5)
SODIUM SERPL-SCNC: 137 MMOL/L
WBC # BLD: 6.68 K/UL — SIGNIFICANT CHANGE UP (ref 3.8–10.5)
WBC # FLD AUTO: 6.68 K/UL — SIGNIFICANT CHANGE UP (ref 3.8–10.5)

## 2024-10-29 PROCEDURE — 99213 OFFICE O/P EST LOW 20 MIN: CPT

## 2024-10-29 PROCEDURE — G2211 COMPLEX E/M VISIT ADD ON: CPT

## 2024-11-02 PROBLEM — C88.00 WALDENSTROM'S DISEASE: Status: ACTIVE | Noted: 2019-09-02

## 2024-11-02 LAB
ALBUMIN MFR SERPL ELPH: 60.7 %
ALBUMIN SERPL-MCNC: 4.1 G/DL
ALBUMIN/GLOB SERPL: 1.5 RATIO
ALPHA1 GLOB MFR SERPL ELPH: 4 %
ALPHA1 GLOB SERPL ELPH-MCNC: 0.3 G/DL
ALPHA2 GLOB MFR SERPL ELPH: 8.9 %
ALPHA2 GLOB SERPL ELPH-MCNC: 0.6 G/DL
B-GLOBULIN MFR SERPL ELPH: 16.6 %
B-GLOBULIN SERPL ELPH-MCNC: 1.1 G/DL
DEPRECATED KAPPA LC FREE/LAMBDA SER: 13.32 RATIO
GAMMA GLOB FLD ELPH-MCNC: 0.7 G/DL
GAMMA GLOB MFR SERPL ELPH: 9.8 %
IGA SER QL IEP: 63 MG/DL
IGG SER QL IEP: 844 MG/DL
IGM SER QL IEP: 491 MG/DL
INTERPRETATION SERPL IEP-IMP: NORMAL
KAPPA LC CSF-MCNC: 0.88 MG/DL
KAPPA LC SERPL-MCNC: 11.72 MG/DL
M PROTEIN MFR SERPL ELPH: 7.3 %
M PROTEIN SPEC IFE-MCNC: NORMAL
MONOCLON BAND OBS SERPL: 0.5 G/DL
PROT SERPL-MCNC: 6.8 G/DL
PROT SERPL-MCNC: 6.8 G/DL

## 2025-01-16 ENCOUNTER — OUTPATIENT (OUTPATIENT)
Dept: OUTPATIENT SERVICES | Facility: HOSPITAL | Age: 70
LOS: 1 days | Discharge: HOME | End: 2025-01-16

## 2025-01-16 DIAGNOSIS — C91.10 CHRONIC LYMPHOCYTIC LEUKEMIA OF B-CELL TYPE NOT HAVING ACHIEVED REMISSION: ICD-10-CM

## 2025-01-27 DIAGNOSIS — C88.00 WALDENSTROM MACROGLOBULINEMIA NOT HAVING ACHIEVED REMISSION: ICD-10-CM

## 2025-02-04 ENCOUNTER — RESULT REVIEW (OUTPATIENT)
Age: 70
End: 2025-02-04

## 2025-02-04 ENCOUNTER — APPOINTMENT (OUTPATIENT)
Dept: HEMATOLOGY ONCOLOGY | Facility: CLINIC | Age: 70
End: 2025-02-04

## 2025-02-04 LAB
BASOPHILS # BLD AUTO: 0.07 K/UL — SIGNIFICANT CHANGE UP (ref 0–0.2)
BASOPHILS NFR BLD AUTO: 1.1 % — SIGNIFICANT CHANGE UP (ref 0–2)
EOSINOPHIL # BLD AUTO: 0.2 K/UL — SIGNIFICANT CHANGE UP (ref 0–0.5)
EOSINOPHIL NFR BLD AUTO: 3.2 % — SIGNIFICANT CHANGE UP (ref 0–6)
HCT VFR BLD CALC: 42.4 % — SIGNIFICANT CHANGE UP (ref 39–50)
HGB BLD-MCNC: 14 G/DL — SIGNIFICANT CHANGE UP (ref 13–17)
IMM GRANULOCYTES NFR BLD AUTO: 1.4 % — HIGH (ref 0–0.9)
LYMPHOCYTES # BLD AUTO: 1.57 K/UL — SIGNIFICANT CHANGE UP (ref 1–3.3)
LYMPHOCYTES # BLD AUTO: 24.8 % — SIGNIFICANT CHANGE UP (ref 13–44)
MCHC RBC-ENTMCNC: 30.4 PG — SIGNIFICANT CHANGE UP (ref 27–34)
MCHC RBC-ENTMCNC: 33 G/DL — SIGNIFICANT CHANGE UP (ref 32–36)
MCV RBC AUTO: 92.2 FL — SIGNIFICANT CHANGE UP (ref 80–100)
MONOCYTES # BLD AUTO: 0.49 K/UL — SIGNIFICANT CHANGE UP (ref 0–0.9)
MONOCYTES NFR BLD AUTO: 7.8 % — SIGNIFICANT CHANGE UP (ref 2–14)
NEUTROPHILS # BLD AUTO: 3.9 K/UL — SIGNIFICANT CHANGE UP (ref 1.8–7.4)
NEUTROPHILS NFR BLD AUTO: 61.7 % — SIGNIFICANT CHANGE UP (ref 43–77)
NRBC # BLD: 0 /100 WBCS — SIGNIFICANT CHANGE UP (ref 0–0)
NRBC BLD-RTO: 0 /100 WBCS — SIGNIFICANT CHANGE UP (ref 0–0)
PLATELET # BLD AUTO: 202 K/UL — SIGNIFICANT CHANGE UP (ref 150–400)
RBC # BLD: 4.6 M/UL — SIGNIFICANT CHANGE UP (ref 4.2–5.8)
RBC # FLD: 13.9 % — SIGNIFICANT CHANGE UP (ref 10.3–14.5)
WBC # BLD: 6.32 K/UL — SIGNIFICANT CHANGE UP (ref 3.8–10.5)
WBC # FLD AUTO: 6.32 K/UL — SIGNIFICANT CHANGE UP (ref 3.8–10.5)

## 2025-02-05 LAB
ALBUMIN SERPL ELPH-MCNC: 4.2 G/DL
ALP BLD-CCNC: 144 U/L
ALT SERPL-CCNC: 25 U/L
ANION GAP SERPL CALC-SCNC: 15 MMOL/L
AST SERPL-CCNC: 26 U/L
BILIRUB SERPL-MCNC: 0.5 MG/DL
BUN SERPL-MCNC: 13 MG/DL
CALCIUM SERPL-MCNC: 9.6 MG/DL
CHLORIDE SERPL-SCNC: 102 MMOL/L
CO2 SERPL-SCNC: 25 MMOL/L
CREAT SERPL-MCNC: 0.96 MG/DL
EGFR: 87 ML/MIN/1.73M2
GLUCOSE SERPL-MCNC: 107 MG/DL
LDH SERPL-CCNC: 161 U/L
POTASSIUM SERPL-SCNC: 4.6 MMOL/L
PROT SERPL-MCNC: 7 G/DL
SODIUM SERPL-SCNC: 142 MMOL/L

## 2025-02-09 LAB
ALBUMIN MFR SERPL ELPH: 57.4 %
ALBUMIN SERPL-MCNC: 4 G/DL
ALBUMIN/GLOB SERPL: 1.3 RATIO
ALPHA1 GLOB MFR SERPL ELPH: 4.6 %
ALPHA1 GLOB SERPL ELPH-MCNC: 0.3 G/DL
ALPHA2 GLOB MFR SERPL ELPH: 10.3 %
ALPHA2 GLOB SERPL ELPH-MCNC: 0.7 G/DL
B-GLOBULIN MFR SERPL ELPH: 18.2 %
B-GLOBULIN SERPL ELPH-MCNC: 1.3 G/DL
DEPRECATED KAPPA LC FREE/LAMBDA SER: 11.18 RATIO
GAMMA GLOB FLD ELPH-MCNC: 0.7 G/DL
GAMMA GLOB MFR SERPL ELPH: 9.5 %
IGA SER QL IEP: 66 MG/DL
IGG SER QL IEP: 825 MG/DL
IGM SER QL IEP: 666 MG/DL
INTERPRETATION SERPL IEP-IMP: NORMAL
KAPPA LC CSF-MCNC: 1.02 MG/DL
KAPPA LC SERPL-MCNC: 11.4 MG/DL
M PROTEIN MFR SERPL ELPH: 9.4 %
M PROTEIN SPEC IFE-MCNC: NORMAL
MONOCLON BAND OBS SERPL: 0.7 G/DL
PROT SERPL-MCNC: 7 G/DL
PROT SERPL-MCNC: 7 G/DL

## 2025-03-03 ENCOUNTER — RESULT REVIEW (OUTPATIENT)
Age: 70
End: 2025-03-03

## 2025-03-03 ENCOUNTER — APPOINTMENT (OUTPATIENT)
Dept: HEMATOLOGY ONCOLOGY | Facility: CLINIC | Age: 70
End: 2025-03-03
Payer: MEDICARE

## 2025-03-03 VITALS
TEMPERATURE: 98.1 F | BODY MASS INDEX: 24.86 KG/M2 | RESPIRATION RATE: 16 BRPM | HEART RATE: 67 BPM | WEIGHT: 167.11 LBS | OXYGEN SATURATION: 99 % | SYSTOLIC BLOOD PRESSURE: 156 MMHG | DIASTOLIC BLOOD PRESSURE: 78 MMHG

## 2025-03-03 DIAGNOSIS — Z51.11 ENCOUNTER FOR ANTINEOPLASTIC CHEMOTHERAPY: ICD-10-CM

## 2025-03-03 DIAGNOSIS — Z71.6 TOBACCO ABUSE COUNSELING: ICD-10-CM

## 2025-03-03 DIAGNOSIS — C88.00 WALDENSTROM MACROGLOBULINEMIA NOT HAVING ACHIEVED REMISSION: ICD-10-CM

## 2025-03-03 DIAGNOSIS — D69.6 THROMBOCYTOPENIA, UNSPECIFIED: ICD-10-CM

## 2025-03-03 DIAGNOSIS — F11.20 OPIOID DEPENDENCE, UNCOMPLICATED: ICD-10-CM

## 2025-03-03 DIAGNOSIS — R76.8 OTHER SPECIFIED ABNORMAL IMMUNOLOGICAL FINDINGS IN SERUM: ICD-10-CM

## 2025-03-03 LAB
BASOPHILS # BLD AUTO: 0.04 K/UL — SIGNIFICANT CHANGE UP (ref 0–0.2)
BASOPHILS NFR BLD AUTO: 0.7 % — SIGNIFICANT CHANGE UP (ref 0–2)
EOSINOPHIL # BLD AUTO: 0.1 K/UL — SIGNIFICANT CHANGE UP (ref 0–0.5)
EOSINOPHIL NFR BLD AUTO: 1.7 % — SIGNIFICANT CHANGE UP (ref 0–6)
HCT VFR BLD CALC: 40.8 % — SIGNIFICANT CHANGE UP (ref 39–50)
HGB BLD-MCNC: 13.4 G/DL — SIGNIFICANT CHANGE UP (ref 13–17)
IMM GRANULOCYTES NFR BLD AUTO: 0.3 % — SIGNIFICANT CHANGE UP (ref 0–0.9)
LYMPHOCYTES # BLD AUTO: 0.7 K/UL — LOW (ref 1–3.3)
LYMPHOCYTES # BLD AUTO: 11.6 % — LOW (ref 13–44)
MCHC RBC-ENTMCNC: 30.5 PG — SIGNIFICANT CHANGE UP (ref 27–34)
MCHC RBC-ENTMCNC: 32.8 G/DL — SIGNIFICANT CHANGE UP (ref 32–36)
MCV RBC AUTO: 92.9 FL — SIGNIFICANT CHANGE UP (ref 80–100)
MONOCYTES # BLD AUTO: 0.54 K/UL — SIGNIFICANT CHANGE UP (ref 0–0.9)
MONOCYTES NFR BLD AUTO: 8.9 % — SIGNIFICANT CHANGE UP (ref 2–14)
NEUTROPHILS # BLD AUTO: 4.66 K/UL — SIGNIFICANT CHANGE UP (ref 1.8–7.4)
NEUTROPHILS NFR BLD AUTO: 76.8 % — SIGNIFICANT CHANGE UP (ref 43–77)
NRBC BLD AUTO-RTO: 0 /100 WBCS — SIGNIFICANT CHANGE UP (ref 0–0)
PLATELET # BLD AUTO: 141 K/UL — LOW (ref 150–400)
RBC # BLD: 4.39 M/UL — SIGNIFICANT CHANGE UP (ref 4.2–5.8)
RBC # FLD: 13.9 % — SIGNIFICANT CHANGE UP (ref 10.3–14.5)
WBC # BLD: 6.06 K/UL — SIGNIFICANT CHANGE UP (ref 3.8–10.5)
WBC # FLD AUTO: 6.06 K/UL — SIGNIFICANT CHANGE UP (ref 3.8–10.5)

## 2025-03-03 PROCEDURE — G2211 COMPLEX E/M VISIT ADD ON: CPT

## 2025-03-03 PROCEDURE — 99214 OFFICE O/P EST MOD 30 MIN: CPT

## 2025-03-05 LAB
ALBUMIN SERPL ELPH-MCNC: 4 G/DL
ALP BLD-CCNC: 116 U/L
ALT SERPL-CCNC: 25 U/L
ANION GAP SERPL CALC-SCNC: 12 MMOL/L
AST SERPL-CCNC: 23 U/L
BILIRUB SERPL-MCNC: 0.3 MG/DL
BUN SERPL-MCNC: 14 MG/DL
CALCIUM SERPL-MCNC: 9.3 MG/DL
CHLORIDE SERPL-SCNC: 101 MMOL/L
CO2 SERPL-SCNC: 27 MMOL/L
CREAT SERPL-MCNC: 0.93 MG/DL
EGFRCR SERPLBLD CKD-EPI 2021: 90 ML/MIN/1.73M2
GLUCOSE SERPL-MCNC: 111 MG/DL
LDH SERPL-CCNC: 155 U/L
POTASSIUM SERPL-SCNC: 4.8 MMOL/L
PROT SERPL-MCNC: 7 G/DL
SODIUM SERPL-SCNC: 140 MMOL/L

## 2025-03-07 LAB
ALBUMIN MFR SERPL ELPH: 57.6 %
ALBUMIN SERPL-MCNC: 4 G/DL
ALBUMIN/GLOB SERPL: 1.3 RATIO
ALPHA1 GLOB MFR SERPL ELPH: 3.9 %
ALPHA1 GLOB SERPL ELPH-MCNC: 0.3 G/DL
ALPHA2 GLOB MFR SERPL ELPH: 9.4 %
ALPHA2 GLOB SERPL ELPH-MCNC: 0.7 G/DL
B-GLOBULIN MFR SERPL ELPH: 19.6 %
B-GLOBULIN SERPL ELPH-MCNC: 1.4 G/DL
DEPRECATED KAPPA LC FREE/LAMBDA SER: 11.5 RATIO
GAMMA GLOB FLD ELPH-MCNC: 0.7 G/DL
GAMMA GLOB MFR SERPL ELPH: 9.5 %
IGA SER QL IEP: 55 MG/DL
IGG SER QL IEP: 708 MG/DL
IGM SER QL IEP: 627 MG/DL
INTERPRETATION SERPL IEP-IMP: NORMAL
KAPPA LC CSF-MCNC: 0.92 MG/DL
KAPPA LC SERPL-MCNC: 10.58 MG/DL
M PROTEIN MFR SERPL ELPH: 11 %
M PROTEIN SPEC IFE-MCNC: NORMAL
MONOCLON BAND OBS SERPL: 0.8 G/DL
PROT SERPL-MCNC: 7 G/DL

## 2025-04-02 PROBLEM — R76.8 HEPATITIS C ANTIBODY TEST POSITIVE: Status: RESOLVED | Noted: 2019-06-25 | Resolved: 2025-04-02

## 2025-05-28 ENCOUNTER — OUTPATIENT (OUTPATIENT)
Dept: OUTPATIENT SERVICES | Facility: HOSPITAL | Age: 70
LOS: 1 days | Discharge: HOME | End: 2025-05-28

## 2025-05-28 DIAGNOSIS — C91.10 CHRONIC LYMPHOCYTIC LEUKEMIA OF B-CELL TYPE NOT HAVING ACHIEVED REMISSION: ICD-10-CM

## 2025-06-10 ENCOUNTER — NON-APPOINTMENT (OUTPATIENT)
Age: 70
End: 2025-06-10

## 2025-07-22 ENCOUNTER — APPOINTMENT (OUTPATIENT)
Dept: HEMATOLOGY ONCOLOGY | Facility: CLINIC | Age: 70
End: 2025-07-22
Payer: SELF-PAY

## 2025-07-22 ENCOUNTER — RESULT REVIEW (OUTPATIENT)
Age: 70
End: 2025-07-22

## 2025-07-22 ENCOUNTER — APPOINTMENT (OUTPATIENT)
Dept: HEMATOLOGY ONCOLOGY | Facility: CLINIC | Age: 70
End: 2025-07-22

## 2025-07-22 VITALS
SYSTOLIC BLOOD PRESSURE: 131 MMHG | TEMPERATURE: 97 F | OXYGEN SATURATION: 98 % | RESPIRATION RATE: 16 BRPM | WEIGHT: 166.89 LBS | HEART RATE: 66 BPM | DIASTOLIC BLOOD PRESSURE: 72 MMHG | BODY MASS INDEX: 24.83 KG/M2

## 2025-07-22 DIAGNOSIS — R76.8 OTHER SPECIFIED ABNORMAL IMMUNOLOGICAL FINDINGS IN SERUM: ICD-10-CM

## 2025-07-22 DIAGNOSIS — Z51.11 ENCOUNTER FOR ANTINEOPLASTIC CHEMOTHERAPY: ICD-10-CM

## 2025-07-22 LAB
ALBUMIN SERPL ELPH-MCNC: 4.5 G/DL
ALP BLD-CCNC: 115 U/L
ALT SERPL-CCNC: 21 U/L
ANION GAP SERPL CALC-SCNC: 16 MMOL/L
AST SERPL-CCNC: 26 U/L
BASOPHILS # BLD AUTO: 0.07 K/UL — SIGNIFICANT CHANGE UP (ref 0–0.2)
BASOPHILS NFR BLD AUTO: 1 % — SIGNIFICANT CHANGE UP (ref 0–2)
BILIRUB SERPL-MCNC: 0.6 MG/DL
BUN SERPL-MCNC: 12 MG/DL
CALCIUM SERPL-MCNC: 10 MG/DL
CHLORIDE SERPL-SCNC: 100 MMOL/L
CO2 SERPL-SCNC: 25 MMOL/L
CREAT SERPL-MCNC: 0.97 MG/DL
EGFRCR SERPLBLD CKD-EPI 2021: 86 ML/MIN/1.73M2
EOSINOPHIL # BLD AUTO: 0.12 K/UL — SIGNIFICANT CHANGE UP (ref 0–0.5)
EOSINOPHIL NFR BLD AUTO: 1.7 % — SIGNIFICANT CHANGE UP (ref 0–6)
GLUCOSE SERPL-MCNC: 111 MG/DL
HCT VFR BLD CALC: 46.7 % — SIGNIFICANT CHANGE UP (ref 39–50)
HGB BLD-MCNC: 15 G/DL — SIGNIFICANT CHANGE UP (ref 13–17)
IMM GRANULOCYTES NFR BLD AUTO: 0.3 % — SIGNIFICANT CHANGE UP (ref 0–0.9)
LDH SERPL-CCNC: 182 U/L
LYMPHOCYTES # BLD AUTO: 2.34 K/UL — SIGNIFICANT CHANGE UP (ref 1–3.3)
LYMPHOCYTES # BLD AUTO: 33 % — SIGNIFICANT CHANGE UP (ref 13–44)
MCHC RBC-ENTMCNC: 30 PG — SIGNIFICANT CHANGE UP (ref 27–34)
MCHC RBC-ENTMCNC: 32.1 G/DL — SIGNIFICANT CHANGE UP (ref 32–36)
MCV RBC AUTO: 93.4 FL — SIGNIFICANT CHANGE UP (ref 80–100)
MONOCYTES # BLD AUTO: 0.48 K/UL — SIGNIFICANT CHANGE UP (ref 0–0.9)
MONOCYTES NFR BLD AUTO: 6.8 % — SIGNIFICANT CHANGE UP (ref 2–14)
NEUTROPHILS # BLD AUTO: 4.06 K/UL — SIGNIFICANT CHANGE UP (ref 1.8–7.4)
NEUTROPHILS NFR BLD AUTO: 57.2 % — SIGNIFICANT CHANGE UP (ref 43–77)
NRBC BLD AUTO-RTO: 0 /100 WBCS — SIGNIFICANT CHANGE UP (ref 0–0)
PLATELET # BLD AUTO: 173 K/UL — SIGNIFICANT CHANGE UP (ref 150–400)
POTASSIUM SERPL-SCNC: 5 MMOL/L
PROT SERPL-MCNC: 7.2 G/DL
RBC # BLD: 5 M/UL — SIGNIFICANT CHANGE UP (ref 4.2–5.8)
RBC # FLD: 14 % — SIGNIFICANT CHANGE UP (ref 10.3–14.5)
SODIUM SERPL-SCNC: 141 MMOL/L
WBC # BLD: 7.09 K/UL — SIGNIFICANT CHANGE UP (ref 3.8–10.5)
WBC # FLD AUTO: 7.09 K/UL — SIGNIFICANT CHANGE UP (ref 3.8–10.5)

## 2025-07-22 PROCEDURE — 99204 OFFICE O/P NEW MOD 45 MIN: CPT

## 2025-07-24 LAB
ALBUMIN MFR SERPL ELPH: 59.6 %
ALBUMIN SERPL-MCNC: 4.2 G/DL
ALBUMIN/GLOB SERPL: 1.4 RATIO
ALPHA1 GLOB MFR SERPL ELPH: 4.1 %
ALPHA1 GLOB SERPL ELPH-MCNC: 0.3 G/DL
ALPHA2 GLOB MFR SERPL ELPH: 10.1 %
ALPHA2 GLOB SERPL ELPH-MCNC: 0.7 G/DL
B-GLOBULIN MFR SERPL ELPH: 17.1 %
B-GLOBULIN SERPL ELPH-MCNC: 1.2 G/DL
DEPRECATED KAPPA LC FREE/LAMBDA SER: 10.05 RATIO
GAMMA GLOB FLD ELPH-MCNC: 0.6 G/DL
GAMMA GLOB MFR SERPL ELPH: 9.1 %
IGA SERPL-MCNC: 65 MG/DL
IGG SERPL-MCNC: 733 MG/DL
IGM SERPL-MCNC: 450 MG/DL
INTERPRETATION SERPL IEP-IMP: NORMAL
KAPPA LC CSF-MCNC: 1.04 MG/DL
KAPPA LC SERPL-MCNC: 10.45 MG/DL
M PROTEIN MFR SERPL ELPH: 7.7 %
M PROTEIN SPEC IFE-MCNC: NORMAL
MONOCLON BAND OBS SERPL: 0.5 G/DL
PROT SERPL-MCNC: 7.1 G/DL
PROT SERPL-MCNC: 7.1 G/DL

## 2025-07-25 DIAGNOSIS — C88.00 WALDENSTROM MACROGLOBULINEMIA NOT HAVING ACHIEVED REMISSION: ICD-10-CM

## 2025-07-25 DIAGNOSIS — F11.20 OPIOID DEPENDENCE, UNCOMPLICATED: ICD-10-CM
